# Patient Record
Sex: MALE | Race: BLACK OR AFRICAN AMERICAN | NOT HISPANIC OR LATINO | Employment: FULL TIME | URBAN - METROPOLITAN AREA
[De-identification: names, ages, dates, MRNs, and addresses within clinical notes are randomized per-mention and may not be internally consistent; named-entity substitution may affect disease eponyms.]

---

## 2017-03-20 ENCOUNTER — HOSPITAL ENCOUNTER (EMERGENCY)
Facility: HOSPITAL | Age: 52
Discharge: HOME | End: 2017-03-20
Attending: EMERGENCY MEDICINE | Admitting: EMERGENCY MEDICINE

## 2017-03-20 ENCOUNTER — APPOINTMENT (EMERGENCY)
Dept: RADIOLOGY | Facility: HOSPITAL | Age: 52
End: 2017-03-20

## 2017-03-20 VITALS
OXYGEN SATURATION: 99 % | DIASTOLIC BLOOD PRESSURE: 75 MMHG | TEMPERATURE: 97.7 F | RESPIRATION RATE: 20 BRPM | WEIGHT: 222 LBS | BODY MASS INDEX: 31.78 KG/M2 | HEIGHT: 70 IN | HEART RATE: 76 BPM | SYSTOLIC BLOOD PRESSURE: 138 MMHG

## 2017-03-20 DIAGNOSIS — R07.89 NON-CARDIAC CHEST PAIN: Primary | ICD-10-CM

## 2017-03-20 LAB
ANION GAP BLD CALC-SCNC: 18 MMOL/L (ref 4–13)
BUN BLD-MCNC: 19 MG/DL (ref 5–25)
CA-I BLD-SCNC: 1.2 MMOL/L (ref 1.12–1.32)
CHLORIDE BLD-SCNC: 101 MMOL/L (ref 100–108)
CREAT BLD-MCNC: 1.3 MG/DL (ref 0.6–1.3)
GFR SERPL CREATININE-BSD FRML MDRD: >60 ML/MIN/1.73SQ M
GLUCOSE SERPL-MCNC: 91 MG/DL (ref 65–140)
HCT VFR BLD CALC: 50 % (ref 36.5–49.3)
HGB BLDA-MCNC: 17 G/DL (ref 12–17)
PCO2 BLD: 26 MMOL/L (ref 21–32)
POTASSIUM BLD-SCNC: 4.3 MMOL/L (ref 3.5–5.3)
SODIUM BLD-SCNC: 139 MMOL/L (ref 136–145)
SPECIMEN SOURCE: ABNORMAL
SPECIMEN SOURCE: NORMAL
TROPONIN I BLD-MCNC: 0 NG/ML (ref 0–0.08)

## 2017-03-20 PROCEDURE — 99284 EMERGENCY DEPT VISIT MOD MDM: CPT

## 2017-03-20 PROCEDURE — 84484 ASSAY OF TROPONIN QUANT: CPT

## 2017-03-20 PROCEDURE — 85014 HEMATOCRIT: CPT

## 2017-03-20 PROCEDURE — 71020 HB CHEST X-RAY 2VW FRONTAL&LATL: CPT

## 2017-03-20 PROCEDURE — 93005 ELECTROCARDIOGRAM TRACING: CPT | Performed by: EMERGENCY MEDICINE

## 2017-03-20 PROCEDURE — 96372 THER/PROPH/DIAG INJ SC/IM: CPT

## 2017-03-20 PROCEDURE — 80047 BASIC METABLC PNL IONIZED CA: CPT

## 2017-03-20 RX ORDER — KETOROLAC TROMETHAMINE 30 MG/ML
30 INJECTION, SOLUTION INTRAMUSCULAR; INTRAVENOUS ONCE
Status: COMPLETED | OUTPATIENT
Start: 2017-03-20 | End: 2017-03-20

## 2017-03-20 RX ORDER — NAPROXEN 500 MG/1
500 TABLET ORAL 2 TIMES DAILY WITH MEALS
Qty: 20 TABLET | Refills: 0 | Status: SHIPPED | OUTPATIENT
Start: 2017-03-20 | End: 2017-07-24

## 2017-03-20 RX ADMIN — KETOROLAC TROMETHAMINE 30 MG: 30 INJECTION, SOLUTION INTRAMUSCULAR at 17:55

## 2017-03-21 LAB
ATRIAL RATE: 84 BPM
P AXIS: 63 DEGREES
PR INTERVAL: 144 MS
QRS AXIS: 26 DEGREES
QRSD INTERVAL: 76 MS
QT INTERVAL: 346 MS
QTC INTERVAL: 408 MS
T WAVE AXIS: 29 DEGREES
VENTRICULAR RATE: 84 BPM

## 2017-03-31 ENCOUNTER — HOSPITAL ENCOUNTER (EMERGENCY)
Facility: HOSPITAL | Age: 52
Discharge: HOME | End: 2017-03-31
Admitting: EMERGENCY MEDICINE

## 2017-03-31 ENCOUNTER — APPOINTMENT (EMERGENCY)
Dept: CT IMAGING | Facility: HOSPITAL | Age: 52
End: 2017-03-31

## 2017-03-31 VITALS
SYSTOLIC BLOOD PRESSURE: 140 MMHG | TEMPERATURE: 98 F | RESPIRATION RATE: 16 BRPM | DIASTOLIC BLOOD PRESSURE: 78 MMHG | OXYGEN SATURATION: 99 % | HEART RATE: 83 BPM

## 2017-03-31 DIAGNOSIS — R07.89 CHEST WALL PAIN: ICD-10-CM

## 2017-03-31 DIAGNOSIS — R91.1 PULMONARY NODULE: ICD-10-CM

## 2017-03-31 DIAGNOSIS — M41.80 DEXTROSCOLIOSIS: Primary | ICD-10-CM

## 2017-03-31 LAB
ANION GAP SERPL CALCULATED.3IONS-SCNC: 8 MMOL/L (ref 4–13)
ATRIAL RATE: 72 BPM
BASOPHILS # BLD AUTO: 0.04 THOUSANDS/ΜL (ref 0–0.1)
BASOPHILS NFR BLD AUTO: 1 % (ref 0–1)
BUN SERPL-MCNC: 16 MG/DL (ref 5–25)
CALCIUM SERPL-MCNC: 10.1 MG/DL (ref 8.3–10.1)
CHLORIDE SERPL-SCNC: 102 MMOL/L (ref 100–108)
CO2 SERPL-SCNC: 30 MMOL/L (ref 21–32)
CREAT SERPL-MCNC: 1.18 MG/DL (ref 0.6–1.3)
EOSINOPHIL # BLD AUTO: 0.12 THOUSAND/ΜL (ref 0–0.61)
EOSINOPHIL NFR BLD AUTO: 1 % (ref 0–6)
ERYTHROCYTE [DISTWIDTH] IN BLOOD BY AUTOMATED COUNT: 13.3 % (ref 11.6–15.1)
GFR SERPL CREATININE-BSD FRML MDRD: >60 ML/MIN/1.73SQ M
GLUCOSE SERPL-MCNC: 88 MG/DL (ref 65–140)
HCT VFR BLD AUTO: 49.3 % (ref 36.5–49.3)
HGB BLD-MCNC: 16.7 G/DL (ref 12–17)
LYMPHOCYTES # BLD AUTO: 3.15 THOUSANDS/ΜL (ref 0.6–4.47)
LYMPHOCYTES NFR BLD AUTO: 37 % (ref 14–44)
MCH RBC QN AUTO: 28 PG (ref 26.8–34.3)
MCHC RBC AUTO-ENTMCNC: 33.9 G/DL (ref 31.4–37.4)
MCV RBC AUTO: 83 FL (ref 82–98)
MONOCYTES # BLD AUTO: 0.54 THOUSAND/ΜL (ref 0.17–1.22)
MONOCYTES NFR BLD AUTO: 6 % (ref 4–12)
NEUTROPHILS # BLD AUTO: 4.73 THOUSANDS/ΜL (ref 1.85–7.62)
NEUTS SEG NFR BLD AUTO: 55 % (ref 43–75)
NRBC BLD AUTO-RTO: 0 /100 WBCS
P AXIS: 68 DEGREES
PLATELET # BLD AUTO: 367 THOUSANDS/UL (ref 149–390)
PMV BLD AUTO: 10.4 FL (ref 8.9–12.7)
POTASSIUM SERPL-SCNC: 4 MMOL/L (ref 3.5–5.3)
PR INTERVAL: 150 MS
QRS AXIS: 42 DEGREES
QRSD INTERVAL: 84 MS
QT INTERVAL: 364 MS
QTC INTERVAL: 398 MS
RBC # BLD AUTO: 5.97 MILLION/UL (ref 3.88–5.62)
SODIUM SERPL-SCNC: 140 MMOL/L (ref 136–145)
T WAVE AXIS: 35 DEGREES
TROPONIN I SERPL-MCNC: <0.02 NG/ML
VENTRICULAR RATE: 72 BPM
WBC # BLD AUTO: 8.61 THOUSAND/UL (ref 4.31–10.16)

## 2017-03-31 PROCEDURE — 93005 ELECTROCARDIOGRAM TRACING: CPT | Performed by: PHYSICIAN ASSISTANT

## 2017-03-31 PROCEDURE — 36415 COLL VENOUS BLD VENIPUNCTURE: CPT | Performed by: PHYSICIAN ASSISTANT

## 2017-03-31 PROCEDURE — 80048 BASIC METABOLIC PNL TOTAL CA: CPT | Performed by: PHYSICIAN ASSISTANT

## 2017-03-31 PROCEDURE — 71275 CT ANGIOGRAPHY CHEST: CPT

## 2017-03-31 PROCEDURE — 85025 COMPLETE CBC W/AUTO DIFF WBC: CPT | Performed by: PHYSICIAN ASSISTANT

## 2017-03-31 PROCEDURE — 84484 ASSAY OF TROPONIN QUANT: CPT | Performed by: PHYSICIAN ASSISTANT

## 2017-03-31 PROCEDURE — 99284 EMERGENCY DEPT VISIT MOD MDM: CPT

## 2017-03-31 RX ORDER — OXYCODONE HYDROCHLORIDE 5 MG/1
5 TABLET ORAL EVERY 4 HOURS PRN
Qty: 20 TABLET | Refills: 0 | Status: SHIPPED | OUTPATIENT
Start: 2017-03-31 | End: 2017-04-10

## 2017-03-31 RX ADMIN — IOHEXOL 85 ML: 350 INJECTION, SOLUTION INTRAVENOUS at 15:10

## 2017-03-31 RX ADMIN — SODIUM CHLORIDE 250 ML: 0.9 INJECTION, SOLUTION INTRAVENOUS at 14:41

## 2017-07-24 ENCOUNTER — HOSPITAL ENCOUNTER (EMERGENCY)
Facility: HOSPITAL | Age: 52
Discharge: HOME | End: 2017-07-24
Payer: COMMERCIAL

## 2017-07-24 VITALS
OXYGEN SATURATION: 99 % | DIASTOLIC BLOOD PRESSURE: 85 MMHG | SYSTOLIC BLOOD PRESSURE: 138 MMHG | WEIGHT: 214 LBS | HEART RATE: 63 BPM | TEMPERATURE: 98 F | BODY MASS INDEX: 30.64 KG/M2 | HEIGHT: 70 IN | RESPIRATION RATE: 18 BRPM

## 2017-07-24 DIAGNOSIS — M54.17 LUMBOSACRAL RADICULOPATHY: ICD-10-CM

## 2017-07-24 DIAGNOSIS — M54.50 LOW BACK PAIN: Primary | ICD-10-CM

## 2017-07-24 PROCEDURE — 99283 EMERGENCY DEPT VISIT LOW MDM: CPT

## 2017-07-24 RX ORDER — METHOCARBAMOL 500 MG/1
500 TABLET, FILM COATED ORAL 2 TIMES DAILY
Qty: 28 TABLET | Refills: 0 | Status: SHIPPED | OUTPATIENT
Start: 2017-07-24 | End: 2017-10-04

## 2017-07-24 RX ORDER — METHYLPREDNISOLONE 4 MG/1
TABLET ORAL
Qty: 21 TABLET | Refills: 0 | Status: SHIPPED | OUTPATIENT
Start: 2017-07-24 | End: 2017-10-04

## 2017-10-04 ENCOUNTER — HOSPITAL ENCOUNTER (EMERGENCY)
Facility: HOSPITAL | Age: 52
Discharge: HOME | End: 2017-10-04
Attending: EMERGENCY MEDICINE
Payer: COMMERCIAL

## 2017-10-04 ENCOUNTER — APPOINTMENT (EMERGENCY)
Dept: RADIOLOGY | Facility: HOSPITAL | Age: 52
End: 2017-10-04
Payer: COMMERCIAL

## 2017-10-04 VITALS
SYSTOLIC BLOOD PRESSURE: 157 MMHG | WEIGHT: 215 LBS | BODY MASS INDEX: 30.78 KG/M2 | HEIGHT: 70 IN | DIASTOLIC BLOOD PRESSURE: 80 MMHG | HEART RATE: 88 BPM | OXYGEN SATURATION: 100 % | TEMPERATURE: 98 F | RESPIRATION RATE: 18 BRPM

## 2017-10-04 DIAGNOSIS — M25.512 LEFT ANTERIOR SHOULDER PAIN: Primary | ICD-10-CM

## 2017-10-04 PROCEDURE — 99283 EMERGENCY DEPT VISIT LOW MDM: CPT

## 2017-10-04 PROCEDURE — 73030 X-RAY EXAM OF SHOULDER: CPT

## 2017-10-04 RX ORDER — OXYCODONE HYDROCHLORIDE AND ACETAMINOPHEN 5; 325 MG/1; MG/1
1 TABLET ORAL EVERY 6 HOURS PRN
Qty: 16 TABLET | Refills: 0 | Status: SHIPPED | OUTPATIENT
Start: 2017-10-04 | End: 2017-10-20

## 2017-10-04 RX ORDER — IBUPROFEN 800 MG/1
800 TABLET ORAL 2 TIMES DAILY
Qty: 20 TABLET | Refills: 0 | Status: SHIPPED | OUTPATIENT
Start: 2017-10-04 | End: 2018-04-30

## 2017-10-04 RX ORDER — OXYCODONE HYDROCHLORIDE AND ACETAMINOPHEN 5; 325 MG/1; MG/1
2 TABLET ORAL ONCE
Status: COMPLETED | OUTPATIENT
Start: 2017-10-04 | End: 2017-10-04

## 2017-10-04 RX ADMIN — OXYCODONE HYDROCHLORIDE AND ACETAMINOPHEN 2 TABLET: 5; 325 TABLET ORAL at 21:11

## 2017-10-05 NOTE — DISCHARGE INSTRUCTIONS
1   No heavy lifting until cleared by the orthopedic surgeon         Shoulder Pain, Ambulatory Care   GENERAL INFORMATION:   Shoulder pain  is a common problem and can affect your daily activities  Pain can be caused by a problem within your shoulder  Shoulder pain may also be caused by pain that spreads to your shoulder from another part of your body  Seek immediate care for the following symptoms:   · Severe pain    · Inability to move your arm or shoulder    · Numbness or tingling in your shoulder or arm  Treatment for shoulder pain  may include any of the following:  · Acetaminophen  decreases pain and fever  It is available without a doctor's order  Ask how much to take and how often to take it  Follow directions  Acetaminophen can cause liver damage if not taken correctly  · NSAIDs  help decrease swelling and pain or fever  This medicine is available with or without a doctor's order  NSAIDs can cause stomach bleeding or kidney problems in certain people  If you take blood thinner medicine, always ask your healthcare provider if NSAIDs are safe for you  Always read the medicine label and follow directions  · A steroid injection  may help decrease pain and swelling  · Surgery  may be needed for long-term pain and loss of function  Manage your symptoms:   · Apply ice  on your shoulder for 20 to 30 minutes every 2 hours or as directed  Use an ice pack, or put crushed ice in a plastic bag  Cover it with a towel  Ice helps prevent tissue damage and decreases swelling and pain  · Apply heat if ice does not help your symptoms  Apply heat on your shoulder for 20 to 30 minutes every 2 hours for as many days as directed  Heat helps decrease pain and muscle spasms  · Go to physical or occupational therapy as directed  A physical therapist teaches you exercises to help improve movement and strength, and to decrease pain   An occupational therapist teaches you skills to help with your daily activities  Prevent shoulder pain:   · Stretch and strengthen your shoulder  Use proper technique during exercises and sports  · Limit activities as directed  Try to avoid repeated overhead movements  Follow up with your healthcare provider or orthopedist as directed:  Write down your questions so you remember to ask them during your visits  CARE AGREEMENT:   You have the right to help plan your care  Learn about your health condition and how it may be treated  Discuss treatment options with your caregivers to decide what care you want to receive  You always have the right to refuse treatment  The above information is an  only  It is not intended as medical advice for individual conditions or treatments  Talk to your doctor, nurse or pharmacist before following any medical regimen to see if it is safe and effective for you  © 2014 [address] is for End User's use only and may not be sold, redistributed or otherwise used for commercial purposes  All illustrations and images included in CareNotes® are the copyrighted property of A D A M , Inc  or [address]  Rotator Cuff Injury   WHAT YOU NEED TO KNOW:   A rotator cuff injury is damage to the muscles or tendons of your rotator cuff  The rotator cuff is made up of a group of muscles and tendons that hold the shoulder joint in place  The damage may include stretching of your muscle or tears in the tendons  It may also include inflammation of the bursa (small sack of fluid around the joint)  DISCHARGE INSTRUCTIONS:   · Acetaminophen: This medicine decreases pain  Acetaminophen is available without a doctor's order  Ask how much to take and how often to take it  Follow directions  Acetaminophen can cause liver damage if not taken correctly  · NSAIDs:  These medicines decrease swelling, pain, and fever  NSAIDs are available without a doctor's order   Ask your healthcare provider which medicine is right for you  Ask how much to take and when to take it  Take as directed  NSAIDs can cause stomach bleeding or kidney problems if not taken correctly  · Pain medicine: You may be given a prescription medicine to decrease pain  Do not wait until the pain is severe before you take this medicine  · Steroids: This medicine may be injected into the rotator cuff area to decrease inflammation and pain  · Take your medicine as directed  Contact your healthcare provider if you think your medicine is not helping or if you have side effects  Tell him of her if you are allergic to any medicine  Keep a list of the medicines, vitamins, and herbs you take  Include the amounts, and when and why you take them  Bring the list or the pill bottles to follow-up visits  Carry your medicine list with you in case of an emergency  Follow up with your healthcare provider or orthopedist as directed:  Write down your questions so you remember to ask them during your visits  Physical therapy:  A physical therapist can teach you exercises to help improve movement and strength, and to decrease pain  These exercises may help you go back to your usual activities or return to playing sports  Self-care:   · Rest:  Rest may help your shoulder heal  Overuse of your shoulder can make your injury worse  Avoid heavy lifting, using your arms over your head, or any other activity that makes the pain worse  · Put ice or heat on your shoulder:  Use ice on your shoulder every few hours for the first several days  This may help decrease pain and swelling  After the first several days, a heating pad may help relax the muscles in your shoulder  Contact your healthcare provider or orthopedist if:   · The pain in your shoulder or arm is not improving, or is worse than before you started treatment  · You have new pain in your neck  · You have a fever  · You have questions or concerns about your condition or care    Return to the emergency department if:  · You suddenly cannot move your arm  · You have severe stomach or back pain, are vomiting blood, or have black bowel movements  © 2017 [address] Information is for End User's use only and may not be sold, redistributed or otherwise used for commercial purposes  All illustrations and images included in CareNotes® are the copyrighted property of A D A M , Inc  or Ricardo Quarles  The above information is an  only  It is not intended as medical advice for individual conditions or treatments  Talk to your doctor, nurse or pharmacist before following any medical regimen to see if it is safe and effective for you

## 2017-10-05 NOTE — ED PROVIDER NOTES
History  Chief Complaint   Patient presents with    Shoulder Pain     left shoulder pain for 3-4 days - pt states he does not have full ROM in it     HPI  51-year-old [place] American male with a chief complaint of left shoulder pain for the past 4 days  Patient states he is not able to lift his arm ir put it behind his back  Patient states he had a history of a rotator cuff tear approximately 4 years ago and has not had a problem with it since  Patient does admit to lifting weights but denies any specific injury  Patient denies any numbness or tingling down his left lower extremity  None       History reviewed  No pertinent past medical history  History reviewed  No pertinent surgical history  History reviewed  No pertinent family history  I have reviewed and agree with the history as documented  Social History   Substance Use Topics    Smoking status: Current Every Day Smoker     Packs/day: 0 50     Types: Cigarettes    Smokeless tobacco: Never Used    Alcohol use No        Review of Systems   Constitutional: Negative for chills, diaphoresis, fatigue and fever  HENT: Negative for congestion, ear pain, nosebleeds, rhinorrhea and sore throat  Eyes: Negative for photophobia, pain, discharge and visual disturbance  Respiratory: Negative for cough, choking, chest tightness, shortness of breath and wheezing  Cardiovascular: Negative for chest pain and palpitations  Gastrointestinal: Negative for abdominal distention, abdominal pain, diarrhea and vomiting  Endocrine: Negative for polydipsia and polyuria  Genitourinary: Negative for dysuria, flank pain, frequency and hematuria  Musculoskeletal: Positive for arthralgias, joint swelling and myalgias  Negative for back pain, gait problem and neck stiffness  Skin: Negative for color change, rash and wound  Neurological: Negative for dizziness, syncope and headaches     Psychiatric/Behavioral: Negative for behavioral problems, confusion and suicidal ideas  The patient is not nervous/anxious  All other systems reviewed and are negative  Physical Exam  ED Triage Vitals [10/04/17 1937]   Temperature Pulse Respirations Blood Pressure SpO2   98 °F (36 7 °C) 88 18 157/80 (!) 10 %      Temp Source Heart Rate Source Patient Position - Orthostatic VS BP Location FiO2 (%)   Temporal Monitor Sitting Left arm --      Pain Score       Worst Possible Pain           Physical Exam   Constitutional: He is oriented to person, place, and time  He appears well-developed and well-nourished  43-year-old  male sitting on the stretcher in no acute distress  HENT:   Head: Normocephalic and atraumatic  Mouth/Throat: Oropharynx is clear and moist    Eyes: EOM are normal  Pupils are equal, round, and reactive to light  Neck: Normal range of motion  Neck supple  Cardiovascular: Normal rate  Pulmonary/Chest: Effort normal    Abdominal: There is no tenderness  Musculoskeletal:   Left arm  There is limited range of motion in extension to about 90° also in abduction  Patient is unable to put his arm behind his back; patient is unable to elevate his arm above his shoulder and stops long term up  Neurological: He is alert and oriented to person, place, and time  No cranial nerve deficit or sensory deficit  He exhibits normal muscle tone  Coordination normal    Skin: Skin is warm and dry  Psychiatric: He has a normal mood and affect  Nursing note and vitals reviewed  ED Medications  Medications   oxyCODONE-acetaminophen (PERCOCET) 5-325 mg per tablet 2 tablet (2 tablets Oral Given 10/4/17 2111)       Diagnostic Studies  Labs Reviewed - No data to display    XR shoulder 2+ views LEFT   ED Interpretation   DJD          Procedures  Procedures      Phone Contacts  ED Phone Contact    ED Course  ED Course                                [facility]  CritCare Time     Differential diagnosis includes:  1  Left shoulder pain  2    Rotator cuff tear  3  Degenerative joint disease  4  Bursitis of the left shoulder  5  Left shoulder strain    Disposition  Final diagnoses:   Left anterior shoulder pain - Hx of rotator cuff tear     ED Disposition     ED Disposition Condition Comment    Discharge  Jasmyne Ibarra discharge to home/self care  Condition at discharge: Good        Follow-up Information     Follow up With Specialties Details Why Yann Mina MD Orthopedic Surgery In 1 week  [address] [facility]  [phone]          Discharge Medication List as of 10/4/2017  9:11 PM      START taking these medications    Details   ibuprofen (MOTRIN) 800 mg tablet Take 1 tablet by mouth 2 (two) times a day for 10 days, Starting Wed 10/4/2017, Until Sat 10/14/2017, Print      oxyCODONE-acetaminophen (PERCOCET) 5-325 mg per tablet Take 1 tablet by mouth every 6 (six) hours as needed for severe pain for up to 16 days Max Daily Amount: 4 tablets, Starting Wed 10/4/2017, Until Fri 10/20/2017, Print           No discharge procedures on file      ED Provider  Electronically Signed by       Josee Smith, DO  10/04/17 Urszula Tena 37, DO  10/04/17 2226

## 2018-04-30 ENCOUNTER — HOSPITAL ENCOUNTER (EMERGENCY)
Facility: HOSPITAL | Age: 53
Discharge: HOME | End: 2018-04-30
Attending: EMERGENCY MEDICINE | Admitting: EMERGENCY MEDICINE
Payer: COMMERCIAL

## 2018-04-30 VITALS
OXYGEN SATURATION: 100 % | RESPIRATION RATE: 18 BRPM | HEART RATE: 74 BPM | BODY MASS INDEX: 28.63 KG/M2 | TEMPERATURE: 97.6 F | SYSTOLIC BLOOD PRESSURE: 149 MMHG | DIASTOLIC BLOOD PRESSURE: 71 MMHG | HEIGHT: 70 IN | WEIGHT: 200 LBS

## 2018-04-30 DIAGNOSIS — M25.512 LEFT SHOULDER PAIN: Primary | ICD-10-CM

## 2018-04-30 PROCEDURE — 99283 EMERGENCY DEPT VISIT LOW MDM: CPT

## 2018-04-30 RX ORDER — NAPROXEN 500 MG/1
500 TABLET ORAL 2 TIMES DAILY WITH MEALS
Qty: 30 TABLET | Refills: 0 | Status: SHIPPED | OUTPATIENT
Start: 2018-04-30 | End: 2020-01-31

## 2018-04-30 NOTE — DISCHARGE INSTRUCTIONS
Shoulder Pain, Ambulatory Care   GENERAL INFORMATION:   Shoulder pain  is a common problem and can affect your daily activities  Pain can be caused by a problem within your shoulder  Shoulder pain may also be caused by pain that spreads to your shoulder from another part of your body  Seek immediate care for the following symptoms:   · Severe pain    · Inability to move your arm or shoulder    · Numbness or tingling in your shoulder or arm  Treatment for shoulder pain  may include any of the following:  · Acetaminophen  decreases pain and fever  It is available without a doctor's order  Ask how much to take and how often to take it  Follow directions  Acetaminophen can cause liver damage if not taken correctly  · NSAIDs  help decrease swelling and pain or fever  This medicine is available with or without a doctor's order  NSAIDs can cause stomach bleeding or kidney problems in certain people  If you take blood thinner medicine, always ask your healthcare provider if NSAIDs are safe for you  Always read the medicine label and follow directions  · A steroid injection  may help decrease pain and swelling  · Surgery  may be needed for long-term pain and loss of function  Manage your symptoms:   · Apply ice  on your shoulder for 20 to 30 minutes every 2 hours or as directed  Use an ice pack, or put crushed ice in a plastic bag  Cover it with a towel  Ice helps prevent tissue damage and decreases swelling and pain  · Apply heat if ice does not help your symptoms  Apply heat on your shoulder for 20 to 30 minutes every 2 hours for as many days as directed  Heat helps decrease pain and muscle spasms  · Go to physical or occupational therapy as directed  A physical therapist teaches you exercises to help improve movement and strength, and to decrease pain  An occupational therapist teaches you skills to help with your daily activities  Prevent shoulder pain:   · Stretch and strengthen your shoulder  Use proper technique during exercises and sports  · Limit activities as directed  Try to avoid repeated overhead movements  Follow up with your healthcare provider or orthopedist as directed:  Write down your questions so you remember to ask them during your visits  CARE AGREEMENT:   You have the right to help plan your care  Learn about your health condition and how it may be treated  Discuss treatment options with your caregivers to decide what care you want to receive  You always have the right to refuse treatment  The above information is an  only  It is not intended as medical advice for individual conditions or treatments  Talk to your doctor, nurse or pharmacist before following any medical regimen to see if it is safe and effective for you  © 2014 [address] is for End User's use only and may not be sold, redistributed or otherwise used for commercial purposes  All illustrations and images included in CareNotes® are the copyrighted property of A D A M , Inc  or Ricardo Quarles

## 2018-04-30 NOTE — ED PROVIDER NOTES
Pt Name: Jesenia Lafleur  MRN: [id number]  [place] 1965  Age/Sex: 46 y o  male  Date of evaluation: 4/30/2018  PCP: Puja Wiley MD    CHIEF COMPLAINT    Chief Complaint   Patient presents with    Shoulder Pain     Pt c/o 2-3 days of left sided shoulder pain  Denies injury, unable to lift  Denies numbness/tingling  HPI    Lauryn Steen presents to the Emergency Department complaining of left shoulder pain  He has had this in the past   He was seen here and then followed up with [facility] orthopedics and was told that he had a strain  He was started on meloxicam which has not been helping him  A few days ago he believes that he picked up one of his kids and now it has been worse  HPI      Past Medical and Surgical History    History reviewed  No pertinent past medical history  History reviewed  No pertinent surgical history  History reviewed  No pertinent family history  Social History   Substance Use Topics    Smoking status: Current Every Day Smoker     Packs/day: 0 50     Types: Cigarettes    Smokeless tobacco: Never Used    Alcohol use No              Allergies    No Known Allergies    Home Medications    Prior to Admission medications    Medication Sig Start Date End Date Taking? Authorizing Provider   ibuprofen (MOTRIN) 800 mg tablet Take 1 tablet by mouth 2 (two) times a day for 10 days 10/4/17 10/14/17  Tamika Ni, DO           Review of Systems    Review of Systems   Constitutional: Negative for activity change, appetite change, chills, fatigue and fever  HENT: Negative for congestion, rhinorrhea, sinus pressure, sneezing, sore throat and trouble swallowing  Eyes: Negative for photophobia and visual disturbance  Respiratory: Negative for chest tightness, shortness of breath and wheezing  Cardiovascular: Negative for chest pain and leg swelling     Gastrointestinal: Negative for abdominal distention, abdominal pain, constipation, diarrhea, nausea and vomiting  Endocrine: Negative for polydipsia, polyphagia and polyuria  Genitourinary: Negative for decreased urine volume, difficulty urinating, dysuria, flank pain, frequency and urgency  Musculoskeletal: Negative for back pain, gait problem, joint swelling and neck pain  Skin: Negative for color change, pallor and rash  Allergic/Immunologic: Negative for immunocompromised state  Neurological: Negative for seizures, syncope, speech difficulty, weakness, light-headedness and headaches  Psychiatric/Behavioral: Negative for confusion  All other systems reviewed and are negative  Physical Exam      ED Triage Vitals [04/30/18 1615]   Temperature Pulse Respirations Blood Pressure SpO2   97 6 °F (36 4 °C) 74 18 149/71 100 %      Temp Source Heart Rate Source Patient Position - Orthostatic VS BP Location FiO2 (%)   Oral Monitor Sitting Right arm --      Pain Score       Worst Possible Pain               Physical Exam   Constitutional: He is oriented to person, place, and time  He appears well-developed and well-nourished  No distress  HENT:   Head: Normocephalic and atraumatic  Nose: Nose normal    Eyes: Conjunctivae and EOM are normal  Pupils are equal, round, and reactive to light  Neck: Normal range of motion  Neck supple  Cardiovascular: Normal rate, regular rhythm and normal heart sounds  Exam reveals no gallop and no friction rub  No murmur heard  Pulmonary/Chest: Effort normal and breath sounds normal  No stridor  No respiratory distress  He has no wheezes  He has no rales  Abdominal: Soft  Bowel sounds are normal  There is no tenderness  There is no rebound and no guarding  Musculoskeletal:        Left shoulder: He exhibits decreased range of motion and tenderness  He exhibits no bony tenderness, no swelling, no effusion, no crepitus, no deformity and normal pulse  Arms:  Neurological: He is alert and oriented to person, place, and time  No cranial nerve deficit  Skin: Skin is warm and dry  No rash noted  He is not diaphoretic  Psychiatric: He has a normal mood and affect  His behavior is normal    Nursing note and vitals reviewed  Assessment and Plan    Rubio Parra is a 46 y o  male who presents with left shoulder pain  Physical examination remarkable for diminished ROM and tenderness to palpation  Differential diagnosis (not completely inclusive) includes rotator cuff injury  Plan will be to  treat symptomatically and refer for outpatient follow up  Patient would like to seek the opinion of a different ortho group  MDM    Diagnostic Results      Labs:      Radiology:    No orders to display       All radiology studies independently viewed by me and interpreted by the radiologist     Procedure    Procedures    CritCare Time      ED Course of Care and Re-Assessments        Medications - No data to display        FINAL IMPRESSION    Final diagnoses:   Left shoulder pain         DISPOSITION/PLAN    Time reflects when diagnosis was documented in both MDM as applicable and the Disposition within this note     Time User Action Codes Description Comment    4/30/2018  4:33 PM Marco Antonio Maharaj Add [A33 512] Left shoulder pain       ED Disposition     None      Follow-up Information     Follow up With Specialties Details Why [address] [facility] Orthopedic Surgery Schedule an appointment as soon as possible for a visit  [address] [place] [address] Tremaine Rick 09 [phone]            PATIENT REFERRED TO:    [facility] [address]  Lamont Beal 89  [phone]  Schedule an appointment as soon as possible for a visit        DISCHARGE MEDICATIONS:    Patient's Medications   Discharge Prescriptions    No medications on file       No discharge procedures on file           Genesis Shepherd DO       [facility],   04/30/18 1395

## 2019-07-15 ENCOUNTER — APPOINTMENT (OUTPATIENT)
Dept: RADIOLOGY | Facility: MEDICAL CENTER | Age: 54
End: 2019-07-15

## 2019-07-15 ENCOUNTER — TRANSCRIBE ORDERS (OUTPATIENT)
Dept: URGENT CARE | Facility: MEDICAL CENTER | Age: 54
End: 2019-07-15

## 2019-07-15 ENCOUNTER — APPOINTMENT (OUTPATIENT)
Dept: PHYSICAL THERAPY | Facility: MEDICAL CENTER | Age: 54
End: 2019-07-15

## 2019-07-15 DIAGNOSIS — Z00.00 PHYSICAL EXAM: ICD-10-CM

## 2019-07-15 DIAGNOSIS — Z00.00 PHYSICAL EXAM: Primary | ICD-10-CM

## 2019-07-15 PROCEDURE — 71046 X-RAY EXAM CHEST 2 VIEWS: CPT

## 2020-01-30 LAB — EXTERNAL HIV SCREEN: NORMAL

## 2020-01-31 ENCOUNTER — OFFICE VISIT (OUTPATIENT)
Dept: FAMILY MEDICINE CLINIC | Facility: CLINIC | Age: 55
End: 2020-01-31
Payer: COMMERCIAL

## 2020-01-31 VITALS
OXYGEN SATURATION: 98 % | RESPIRATION RATE: 16 BRPM | TEMPERATURE: 97.4 F | HEIGHT: 70 IN | BODY MASS INDEX: 29.35 KG/M2 | DIASTOLIC BLOOD PRESSURE: 90 MMHG | WEIGHT: 205 LBS | SYSTOLIC BLOOD PRESSURE: 140 MMHG | HEART RATE: 68 BPM

## 2020-01-31 DIAGNOSIS — M51.9 LUMBAR DISC DISEASE: ICD-10-CM

## 2020-01-31 DIAGNOSIS — Z82.49 FAMILY HISTORY OF PREMATURE CAD: Primary | ICD-10-CM

## 2020-01-31 DIAGNOSIS — Z00.00 HEALTH MAINTENANCE EXAMINATION: ICD-10-CM

## 2020-01-31 DIAGNOSIS — Z72.0 TOBACCO USE: ICD-10-CM

## 2020-01-31 PROCEDURE — 99386 PREV VISIT NEW AGE 40-64: CPT | Performed by: FAMILY MEDICINE

## 2020-01-31 RX ORDER — OXYCODONE HYDROCHLORIDE AND ACETAMINOPHEN 5; 325 MG/1; MG/1
1 TABLET ORAL EVERY 4 HOURS PRN
Qty: 30 TABLET | Refills: 0 | Status: SHIPPED | OUTPATIENT
Start: 2020-01-31 | End: 2020-02-13 | Stop reason: SDUPTHER

## 2020-01-31 RX ORDER — NAPROXEN 500 MG/1
500 TABLET ORAL 2 TIMES DAILY WITH MEALS
Qty: 60 TABLET | Refills: 3 | Status: SHIPPED | OUTPATIENT
Start: 2020-01-31 | End: 2021-02-09 | Stop reason: ALTCHOICE

## 2020-01-31 RX ORDER — METHOCARBAMOL 750 MG/1
750 TABLET, FILM COATED ORAL EVERY 6 HOURS PRN
Qty: 30 TABLET | Refills: 3 | Status: SHIPPED | OUTPATIENT
Start: 2020-01-31 | End: 2020-04-17

## 2020-01-31 RX ORDER — TIZANIDINE 4 MG/1
TABLET ORAL
Qty: 1 TABLET | Refills: 0 | OUTPATIENT
Start: 2020-01-31

## 2020-01-31 NOTE — PROGRESS NOTES
Assessment/Plan:  Full blood profile including PSA and colonoscopy are recommended  Patient wished to hold off on these at this time  He will return as needed  Diagnoses and all orders for this visit:    Family history of premature CAD  -     aspirin 81 MG tablet; Take 1 tablet (81 mg total) by mouth daily    Health maintenance examination    Lumbar disc disease  -     naproxen (NAPROSYN) 500 mg tablet; Take 1 tablet (500 mg total) by mouth 2 (two) times a day with meals  -     methocarbamol (ROBAXIN) 750 mg tablet; Take 1 tablet (750 mg total) by mouth every 6 (six) hours as needed for muscle spasms  -     oxyCODONE-acetaminophen (PERCOCET) 5-325 mg per tablet; Take 1 tablet by mouth every 4 (four) hours as needed for moderate painMax Daily Amount: 6 tablets    Tobacco use        Tobacco use  Smoking cessation recommended    BMI Counseling: Body mass index is 29 84 kg/m²  The BMI is above normal  Nutrition recommendations include decreasing portion sizes and moderation in carbohydrate intake  Exercise recommendations include exercising 3-5 times per week  No pharmacotherapy was ordered  Subjective:      Patient ID: Que Chung is a 47 y o  male  This is a new patient to our practice  He comes in to establish himself with us  Has a 10 day history of flare-up of his lumbar disc disease  The following portions of the patient's history were reviewed and updated as appropriate:   He has no past medical history on file  ,  does not have any pertinent problems on file  ,   has a past surgical history that includes Hernia repair (09/28/2019)  ,  family history includes Coronary artery disease in his mother; Hypertension in his mother  ,   reports that he has been smoking cigarettes  He has been smoking about 0 50 packs per day  He has never used smokeless tobacco  He reports that he does not drink alcohol or use drugs  ,  has No Known Allergies     Current Outpatient Medications   Medication Sig Dispense Refill    aspirin 81 MG tablet Take 1 tablet (81 mg total) by mouth daily      methocarbamol (ROBAXIN) 750 mg tablet Take 1 tablet (750 mg total) by mouth every 6 (six) hours as needed for muscle spasms 30 tablet 3    naproxen (NAPROSYN) 500 mg tablet Take 1 tablet (500 mg total) by mouth 2 (two) times a day with meals 60 tablet 3    oxyCODONE-acetaminophen (PERCOCET) 5-325 mg per tablet Take 1 tablet by mouth every 4 (four) hours as needed for moderate painMax Daily Amount: 6 tablets 30 tablet 0     No current facility-administered medications for this visit  Review of Systems   Constitutional: Negative  HENT: Negative  Respiratory: Negative  Cardiovascular: Negative  Gastrointestinal: Negative  Endocrine: Negative  Musculoskeletal: Positive for back pain  Allergic/Immunologic: Negative  Neurological: Negative  Hematological: Negative  Psychiatric/Behavioral: Negative  Objective:  Vitals:    01/31/20 0956   BP: 140/90   Pulse: 68   Resp: 16   Temp: (!) 97 4 °F (36 3 °C)   SpO2: 98%   Weight: 93 kg (205 lb)   Height: 5' 9 5" (1 765 m)     Body mass index is 29 84 kg/m²  Physical Exam   Constitutional: He is oriented to person, place, and time  He appears well-developed and well-nourished  HENT:   Head: Normocephalic and atraumatic  Right Ear: Tympanic membrane and external ear normal    Left Ear: Tympanic membrane and external ear normal    Eyes: Pupils are equal, round, and reactive to light  EOM are normal    Neck: Neck supple  Cardiovascular: Normal rate, regular rhythm and normal heart sounds  Pulmonary/Chest: Effort normal and breath sounds normal    Abdominal: Soft  Bowel sounds are normal    Musculoskeletal:   Tender lumbar paraspinal muscles  Straight leg raising bilaterally to 30° worsens his pain  Neurological: He is alert and oriented to person, place, and time  Skin: Skin is warm and dry     Psychiatric: He has a normal mood and affect   Thought content normal

## 2020-02-13 DIAGNOSIS — M51.9 LUMBAR DISC DISEASE: ICD-10-CM

## 2020-02-13 RX ORDER — OXYCODONE HYDROCHLORIDE AND ACETAMINOPHEN 5; 325 MG/1; MG/1
1 TABLET ORAL EVERY 4 HOURS PRN
Qty: 10 TABLET | Refills: 0 | Status: SHIPPED | OUTPATIENT
Start: 2020-02-13 | End: 2020-02-17 | Stop reason: SDUPTHER

## 2020-02-13 NOTE — TELEPHONE ENCOUNTER
Site checked  01/31/2020  1  01/31/2020  OXYCODONE-ACETAMINOPHEN 5-325  30 0  5  BR JOSH  [id number]  [facility] ([id number])  0  45  0 MME  [place]    09/26/2019  1  09/26/2019  OXYCODONE-ACETAMINOPHEN 5-325  20 0  4  DE THUY  [id number]  [facility] ([id number])  0  37 5 MME  [place]    09/17/2019  1  09/17/2019  OXYCODONE-ACETAMINOPHEN 5-325  30 0  5

## 2020-02-17 ENCOUNTER — OFFICE VISIT (OUTPATIENT)
Dept: FAMILY MEDICINE CLINIC | Facility: CLINIC | Age: 55
End: 2020-02-17
Payer: COMMERCIAL

## 2020-02-17 VITALS
DIASTOLIC BLOOD PRESSURE: 90 MMHG | HEART RATE: 72 BPM | OXYGEN SATURATION: 98 % | WEIGHT: 205 LBS | SYSTOLIC BLOOD PRESSURE: 138 MMHG | HEIGHT: 70 IN | TEMPERATURE: 98.5 F | RESPIRATION RATE: 16 BRPM | BODY MASS INDEX: 29.35 KG/M2

## 2020-02-17 DIAGNOSIS — M51.9 THORACIC DISC DISEASE: ICD-10-CM

## 2020-02-17 DIAGNOSIS — M51.9 LUMBAR DISC DISEASE: Primary | ICD-10-CM

## 2020-02-17 DIAGNOSIS — G89.4 CHRONIC PAIN SYNDROME: ICD-10-CM

## 2020-02-17 PROCEDURE — 99212 OFFICE O/P EST SF 10 MIN: CPT | Performed by: FAMILY MEDICINE

## 2020-02-17 PROCEDURE — 3008F BODY MASS INDEX DOCD: CPT | Performed by: FAMILY MEDICINE

## 2020-02-17 RX ORDER — OXYCODONE HYDROCHLORIDE AND ACETAMINOPHEN 5; 325 MG/1; MG/1
TABLET ORAL
Qty: 90 TABLET | Refills: 0 | Status: SHIPPED | OUTPATIENT
Start: 2020-02-17 | End: 2020-03-16 | Stop reason: SDUPTHER

## 2020-02-17 NOTE — PROGRESS NOTES
Assessment/Plan:  Return visit in 1 month  Patient is aware of the possibility of side effects, addiction and overdose with narcotic pain medication  We are having him sign a controlled substance contract  Diagnoses and all orders for this visit:    Lumbar disc disease  -     oxyCODONE-acetaminophen (PERCOCET) 5-325 mg per tablet; 1 tid for pain  -     Ambulatory referral to Pain Management; Future    Thoracic disc disease  -     Ambulatory referral to Pain Management; Future    Chronic pain syndrome        No problem-specific Assessment & Plan notes found for this encounter  Subjective:      Patient ID: Chava Benavidez is a 47 y o  male  Patient comes in with persistent mid and lower back pain  There is some radiation down the back of his legs  He get good relief with Percocet 3 times a day  He has been in pain management in the past and had epidural injections which gave him no relief  He has a great fear of having back surgery  The following portions of the patient's history were reviewed and updated as appropriate:   He has no past medical history on file  ,  does not have any pertinent problems on file  ,   has a past surgical history that includes Hernia repair (09/28/2019)  ,  family history includes Coronary artery disease in his mother; Hypertension in his mother  ,   reports that he has been smoking cigarettes  He has been smoking about 0 50 packs per day  He has never used smokeless tobacco  He reports that he does not drink alcohol or use drugs  ,  has No Known Allergies     Current Outpatient Medications   Medication Sig Dispense Refill    aspirin 81 MG tablet Take 1 tablet (81 mg total) by mouth daily      methocarbamol (ROBAXIN) 750 mg tablet Take 1 tablet (750 mg total) by mouth every 6 (six) hours as needed for muscle spasms 30 tablet 3    naproxen (NAPROSYN) 500 mg tablet Take 1 tablet (500 mg total) by mouth 2 (two) times a day with meals 60 tablet 3    oxyCODONE-acetaminophen (PERCOCET) 5-325 mg per tablet 1 tid for pain 90 tablet 0     No current facility-administered medications for this visit  Review of Systems   Constitutional: Negative  Respiratory: Negative  Cardiovascular: Negative  Objective:  Vitals:    02/17/20 1259   BP: 138/90   Pulse: 72   Resp: 16   Temp: 98 5 °F (36 9 °C)   SpO2: 98%   Weight: 93 kg (205 lb)   Height: 5' 9 5" (1 765 m)     Body mass index is 29 84 kg/m²  Physical Exam   Musculoskeletal:   Tender thoracic and lumbar paraspinal muscles  Straight leg raising bilaterally is negative

## 2020-03-06 ENCOUNTER — TELEPHONE (OUTPATIENT)
Dept: FAMILY MEDICINE CLINIC | Facility: CLINIC | Age: 55
End: 2020-03-06

## 2020-03-06 NOTE — TELEPHONE ENCOUNTER
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

## 2020-03-12 ENCOUNTER — TELEPHONE (OUTPATIENT)
Dept: FAMILY MEDICINE CLINIC | Facility: CLINIC | Age: 55
End: 2020-03-12

## 2020-03-12 DIAGNOSIS — M51.9 LUMBAR DISC DISEASE: ICD-10-CM

## 2020-03-12 NOTE — TELEPHONE ENCOUNTER
Patient called for refill on his pain medication  [facility] filled on 2/17/20 for 30 day supply  Patient states he has been out of his medication for 2 days  Patient states he is due for a refill  5 min's later he called and spoke with Franchesca Landers and wanted to cancel refill on medication cause he didn't want to void his contract  02/17/2020  1  02/17/2020  OXYCODONE-ACETAMINOPHEN 5-325  90 0  30  BR JOSH  [id number]  [facility] ([id number])  0  22 5 MME  [place]    02/13/2020  1  02/13/2020  OXYCODONE-ACETAMINOPHEN 5-325  10 0  2  DEMETRIA JESSICA  [id number]  [facility] ([id number])  0  37 5 MME  [place]    01/31/2020  1  01/31/2020  OXYCODONE-ACETAMINOPHEN 5-325  30 0  5  BR JOSH  [id number]  [facility] ([id number])  0  45  0 MME  [place]    09/26/2019  1  09/26/2019  OXYCODONE-ACETAMINOPHEN 5-325  20 0  4  DE THUY  [id number]  [facility] ([id number])  0  37 5 MME  [place]    09/17/2019  1  09/17/2019  OXYCODONE-ACETAMINOPHEN 5-325  30 0  5  NI TEL  [id number]  [facility] ([id number])  0  45  0 MME  [place]          This is a FYI  Would you like a Drug test collection at visit on 3/17/2020?

## 2020-03-16 DIAGNOSIS — M51.9 LUMBAR DISC DISEASE: ICD-10-CM

## 2020-03-16 RX ORDER — OXYCODONE HYDROCHLORIDE AND ACETAMINOPHEN 5; 325 MG/1; MG/1
TABLET ORAL
Qty: 90 TABLET | Refills: 0 | Status: SHIPPED | OUTPATIENT
Start: 2020-03-16 | End: 2020-04-17 | Stop reason: SDUPTHER

## 2020-03-16 NOTE — TELEPHONE ENCOUNTER
Pt has appt tomorrow for checkup and meds, UDS  He wants to postpone due to the CV  Ok to postpone and still get refills?   [phone]

## 2020-03-16 NOTE — TELEPHONE ENCOUNTER
Pt r/s for 1 month, April 17th at 1581     Ok for UDS there?    02/17/2020  1  02/17/2020  OXYCODONE-ACETAMINOPHEN 5-325  90 0  30  BR JOSH  [id number]  [facility] ([id number])  0  22 5 MME  [place]    02/13/2020  1  02/13/2020  OXYCODONE-ACETAMINOPHEN 5-325  10 0  2  DEMETRIA JESSICA  [id number]  [facility] ([id number])  0  37 5 MME  [place]    01/31/2020  1  01/31/2020  OXYCODONE-ACETAMINOPHEN 5-325  30 0  5  BR J[facility]  [id number]

## 2020-03-17 ENCOUNTER — TELEPHONE (OUTPATIENT)
Dept: FAMILY MEDICINE CLINIC | Facility: CLINIC | Age: 55
End: 2020-03-17

## 2020-03-17 NOTE — TELEPHONE ENCOUNTER
Prior Auth received on Oxycodone  Pretty sure this patient pays out of pocket  Unable to reach pharmacy by phone  I faxed them a note asking if he pays out of pocket or not to see if P A  Was needed

## 2020-04-17 ENCOUNTER — TELEMEDICINE (OUTPATIENT)
Dept: FAMILY MEDICINE CLINIC | Facility: CLINIC | Age: 55
End: 2020-04-17
Payer: COMMERCIAL

## 2020-04-17 DIAGNOSIS — M51.9 LUMBAR DISC DISEASE: ICD-10-CM

## 2020-04-17 PROCEDURE — G2012 BRIEF CHECK IN BY MD/QHP: HCPCS | Performed by: FAMILY MEDICINE

## 2020-04-17 RX ORDER — OXYCODONE HYDROCHLORIDE AND ACETAMINOPHEN 5; 325 MG/1; MG/1
TABLET ORAL
Qty: 90 TABLET | Refills: 0 | Status: SHIPPED | OUTPATIENT
Start: 2020-04-17 | End: 2020-05-11 | Stop reason: SDUPTHER

## 2020-05-11 DIAGNOSIS — M51.9 LUMBAR DISC DISEASE: ICD-10-CM

## 2020-05-11 RX ORDER — OXYCODONE HYDROCHLORIDE AND ACETAMINOPHEN 5; 325 MG/1; MG/1
TABLET ORAL
Qty: 90 TABLET | Refills: 0 | Status: SHIPPED | OUTPATIENT
Start: 2020-05-11 | End: 2020-06-12 | Stop reason: SDUPTHER

## 2020-06-12 DIAGNOSIS — M51.9 LUMBAR DISC DISEASE: ICD-10-CM

## 2020-06-12 RX ORDER — OXYCODONE HYDROCHLORIDE AND ACETAMINOPHEN 5; 325 MG/1; MG/1
TABLET ORAL
Qty: 90 TABLET | Refills: 0 | Status: SHIPPED | OUTPATIENT
Start: 2020-06-12 | End: 2020-07-10 | Stop reason: SDUPTHER

## 2020-07-10 DIAGNOSIS — M51.9 LUMBAR DISC DISEASE: ICD-10-CM

## 2020-07-10 RX ORDER — OXYCODONE HYDROCHLORIDE AND ACETAMINOPHEN 5; 325 MG/1; MG/1
TABLET ORAL
Qty: 90 TABLET | Refills: 0 | Status: SHIPPED | OUTPATIENT
Start: 2020-07-10 | End: 2020-08-10 | Stop reason: SDUPTHER

## 2020-07-10 NOTE — TELEPHONE ENCOUNTER
Medication:  PDMP   06/12/2020  1  06/12/2020  OXYCODONE-ACETAMINOPHEN 5-325  90 0  30  BR JOSH  [id number]  [facility] ([id number])  0  22 5 MME  [place]    05/14/2020  1  05/11/2020  OXYCODONE-ACETAMINOPHEN 5-325  90 0  30  BR JOSH  [id number]  [facility] ([id number])  0  22 5 MME  [place]    04/17/2020  1  04/17/2020  OXYCODONE-ACETAMINOPHEN 5-325  90 0  30  BR JOSH  [id number]  [facility] ([id number])  0  22 5 MME  [place]    03/16/2020  1  03/16/2020  OXYCODONE-ACETAMINOPHEN 5-325  90 0  30  BR JOSH  [id number]  [facility]            Active agreement on file -Yes

## 2020-07-10 NOTE — TELEPHONE ENCOUNTER
Oxycodone-acetaminophen (PERCOCET) 5-325 mg per tablet     Pt called for refill of above    Next appt 8/14/20

## 2020-08-10 DIAGNOSIS — M51.9 LUMBAR DISC DISEASE: ICD-10-CM

## 2020-08-10 RX ORDER — OXYCODONE HYDROCHLORIDE AND ACETAMINOPHEN 5; 325 MG/1; MG/1
TABLET ORAL
Qty: 90 TABLET | Refills: 0 | Status: SHIPPED | OUTPATIENT
Start: 2020-08-10 | End: 2020-09-16 | Stop reason: SDUPTHER

## 2020-08-10 NOTE — TELEPHONE ENCOUNTER
Medication:  PDMP   07/10/2020  1  07/10/2020  OXYCODONE-ACETAMINOPHEN 5-325  90 0  30  BR JOSH  [id number]  [facility] ([id number])  0  22 5 MME  [place]    06/12/2020  1  06/12/2020  OXYCODONE-ACETAMINOPHEN 5-325  90 0  30  BR JOSH  [id number]  [facility] ([id number])  0  22 5 MME  [place]    05/14/2020  1  05/11/2020  OXYCODONE-ACETAMINOPHEN 5-325  90 0  30  BR JOSH  [id number]  [facility] ([id number])  0  22 5 MME  [place]       Active agreement on file -Yes  Symptoms

## 2020-09-08 DIAGNOSIS — M51.9 LUMBAR DISC DISEASE: ICD-10-CM

## 2020-09-09 NOTE — TELEPHONE ENCOUNTER
Medication:  PDMP   08/10/2020  1  08/10/2020  OXYCODONE-ACETAMINOPHEN 5-325  90 0  30  BR JOSH  [id number]  [facility] ([id number])  0  22 5 MME  [place]    07/10/2020  1  07/10/2020  OXYCODONE-ACETAMINOPHEN 5-325  90 0  30  BR JOSH  [id number]  [facility] ([id number])  0  22 5 MME  [place]    06/12/2020  1  06/12/2020  OXYCODONE-ACETAMINOPHEN 5-325  90 0  30  BR JOSH  [id number]  [facility] ([id number])  0  22 5 MME  [place]    05/14/2020  1  05/11/2020  OXYCODONE-ACETAMINOPHEN 5-325  90 0  30  BR JOSH  [id number]  [facility] ([id number])  0  22 5 MME  [place]    04/17/2020  1  04/17/2020  OXYCODONE-ACETAMINOPHEN 5-325  90 0  30  BR JOSH  [id number]  [facility] ([id number])  0  22 5         Active agreement on file -Yes

## 2020-09-10 RX ORDER — OXYCODONE HYDROCHLORIDE AND ACETAMINOPHEN 5; 325 MG/1; MG/1
TABLET ORAL
Qty: 90 TABLET | Refills: 0 | OUTPATIENT
Start: 2020-09-10

## 2020-09-16 DIAGNOSIS — M51.9 LUMBAR DISC DISEASE: ICD-10-CM

## 2020-09-16 RX ORDER — OXYCODONE HYDROCHLORIDE AND ACETAMINOPHEN 5; 325 MG/1; MG/1
TABLET ORAL
Qty: 30 TABLET | Refills: 0 | Status: SHIPPED | OUTPATIENT
Start: 2020-09-16 | End: 2020-10-08 | Stop reason: SDUPTHER

## 2020-09-16 NOTE — TELEPHONE ENCOUNTER
Pt needs rx refilled  We had to reschedule his appt for 9/17/2020 as Dr Gayle Mckeon will not be in the office  Pt has requested to see Dr Gayle Mckeon only and has made appt for 10/8/2020  Yes

## 2020-09-16 NOTE — TELEPHONE ENCOUNTER
Medication:  PDMP   08/10/2020  1  08/10/2020  OXYCODONE-ACETAMINOPHEN 5-325  90 0  30  BR JOSH  [id number]  [facility] ([id number])  0  22 5 MME  [place]    07/10/2020  1  07/10/2020  OXYCODONE-ACETAMINOPHEN 5-325  90 0  30  BR JOSH  [id number]  [facility] ([id number])  0  22 5 MME  [place]    06/12/2020  1  06/12/2020  OXYCODONE-ACETAMINOPHEN 5-325  90 0  30  BR JOSH  [id number]  [facility] ([id number])  0  22 5 MME  [place]        Active agreement on file -Yes      Dr Abelardo Davila is off  Pt has appt 10/08/2020   Pt is out of this med

## 2020-09-17 ENCOUNTER — TELEPHONE (OUTPATIENT)
Dept: FAMILY MEDICINE CLINIC | Facility: CLINIC | Age: 55
End: 2020-09-17

## 2020-09-17 NOTE — TELEPHONE ENCOUNTER
Called patient to let him know med requested has been filled , unable to leave message no voicemail set up will try later

## 2020-10-08 ENCOUNTER — TELEPHONE (OUTPATIENT)
Dept: FAMILY MEDICINE CLINIC | Facility: CLINIC | Age: 55
End: 2020-10-08

## 2020-10-08 ENCOUNTER — OFFICE VISIT (OUTPATIENT)
Dept: FAMILY MEDICINE CLINIC | Facility: CLINIC | Age: 55
End: 2020-10-08

## 2020-10-08 VITALS
HEART RATE: 69 BPM | SYSTOLIC BLOOD PRESSURE: 136 MMHG | OXYGEN SATURATION: 99 % | DIASTOLIC BLOOD PRESSURE: 82 MMHG | TEMPERATURE: 97 F | HEIGHT: 70 IN | BODY MASS INDEX: 29.63 KG/M2 | WEIGHT: 207 LBS

## 2020-10-08 DIAGNOSIS — M51.9 LUMBAR DISC DISEASE: Primary | ICD-10-CM

## 2020-10-08 DIAGNOSIS — G89.4 CHRONIC PAIN SYNDROME: ICD-10-CM

## 2020-10-08 DIAGNOSIS — Z72.0 TOBACCO USE: ICD-10-CM

## 2020-10-08 DIAGNOSIS — M51.9 THORACIC DISC DISEASE: ICD-10-CM

## 2020-10-08 PROCEDURE — 99212 OFFICE O/P EST SF 10 MIN: CPT | Performed by: FAMILY MEDICINE

## 2020-10-08 RX ORDER — OXYCODONE HYDROCHLORIDE AND ACETAMINOPHEN 5; 325 MG/1; MG/1
TABLET ORAL
Qty: 120 TABLET | Refills: 0 | Status: SHIPPED | OUTPATIENT
Start: 2020-10-08 | End: 2020-11-09 | Stop reason: SDUPTHER

## 2020-11-09 DIAGNOSIS — M51.9 LUMBAR DISC DISEASE: ICD-10-CM

## 2020-11-09 RX ORDER — OXYCODONE HYDROCHLORIDE AND ACETAMINOPHEN 5; 325 MG/1; MG/1
TABLET ORAL
Qty: 120 TABLET | Refills: 0 | Status: SHIPPED | OUTPATIENT
Start: 2020-11-09 | End: 2020-12-08 | Stop reason: SDUPTHER

## 2020-12-08 DIAGNOSIS — M51.9 LUMBAR DISC DISEASE: ICD-10-CM

## 2020-12-08 RX ORDER — OXYCODONE HYDROCHLORIDE AND ACETAMINOPHEN 5; 325 MG/1; MG/1
TABLET ORAL
Qty: 120 TABLET | Refills: 0 | Status: SHIPPED | OUTPATIENT
Start: 2020-12-08 | End: 2021-01-04 | Stop reason: SDUPTHER

## 2020-12-11 ENCOUNTER — TELEPHONE (OUTPATIENT)
Dept: FAMILY MEDICINE CLINIC | Facility: CLINIC | Age: 55
End: 2020-12-11

## 2020-12-11 DIAGNOSIS — Z72.0 TOBACCO USE: Primary | ICD-10-CM

## 2020-12-11 RX ORDER — VARENICLINE TARTRATE 25 MG
KIT ORAL
Qty: 53 TABLET | Refills: 0 | Status: SHIPPED | OUTPATIENT
Start: 2020-12-11 | End: 2021-01-04 | Stop reason: SDUPTHER

## 2021-01-04 DIAGNOSIS — Z72.0 TOBACCO USE: ICD-10-CM

## 2021-01-04 DIAGNOSIS — M51.9 LUMBAR DISC DISEASE: ICD-10-CM

## 2021-01-04 RX ORDER — OXYCODONE HYDROCHLORIDE AND ACETAMINOPHEN 5; 325 MG/1; MG/1
TABLET ORAL
Qty: 120 TABLET | Refills: 0 | Status: SHIPPED | OUTPATIENT
Start: 2021-01-04 | End: 2021-02-03 | Stop reason: SDUPTHER

## 2021-01-04 RX ORDER — VARENICLINE TARTRATE 25 MG
KIT ORAL
Qty: 53 TABLET | Refills: 0 | Status: SHIPPED | OUTPATIENT
Start: 2021-01-04 | End: 2021-02-09 | Stop reason: ALTCHOICE

## 2021-01-04 NOTE — TELEPHONE ENCOUNTER
Medication:  PDMP   12/08/2020  1  12/08/2020  OXYCODONE-ACETAMINOPHEN 5-325  120 0  30  BR JOSH  [id number]  [facility] ([id number])  0  30 0 MME  [place]    11/09/2020  1  11/09/2020  OXYCODONE-ACETAMINOPHEN 5-325  120 0  30  BR JOSH  [id number]  [facility] ([id number])  0  30 0 MME  [place]    10/08/2020  1  10/08/2020  OXYCODONE-ACETAMINOPHEN 5-325  120 0  30  BR JOSH  [id number]  [facility] ([id number])  0  30 0 MME  [place]        Active agreement on file -Yes

## 2021-02-03 DIAGNOSIS — M51.9 LUMBAR DISC DISEASE: ICD-10-CM

## 2021-02-03 RX ORDER — OXYCODONE HYDROCHLORIDE AND ACETAMINOPHEN 5; 325 MG/1; MG/1
TABLET ORAL
Qty: 120 TABLET | Refills: 0 | Status: SHIPPED | OUTPATIENT
Start: 2021-02-03 | End: 2021-03-01 | Stop reason: SDUPTHER

## 2021-02-03 NOTE — TELEPHONE ENCOUNTER
01/04/2021  1  01/04/2021  OXYCODONE-ACETAMINOPHEN 5-325  120 0  30  BR JOSH  [id number]  [facility] ([id number])  0  30 0 MME  [place]    12/08/2020  1  12/08/2020  OXYCODONE-ACETAMINOPHEN 5-325  120 0  30  BR JOSH  [id number]  [facility] ([id number])  0  30 0 MME  [place]    11/09/2020  1  11/09/2020  OXYCODONE-ACETAMINOPHEN 5-325  120 0  30  BR JOSH  [id number]  [facility] (4776)  0  30 0 MME  [place]    10/08/2020  1  10/08/2020  OXYCODONE-ACETAMINOPHEN 5-325  120 0  30  BR JOSH

## 2021-02-09 DIAGNOSIS — Z72.0 TOBACCO USE: ICD-10-CM

## 2021-02-09 DIAGNOSIS — Z72.0 TOBACCO USE: Primary | ICD-10-CM

## 2021-02-09 PROBLEM — Z87.19 HISTORY OF UMBILICAL HERNIA: Status: ACTIVE | Noted: 2019-09-30

## 2021-02-09 PROBLEM — M41.9 SCOLIOSIS DEFORMITY OF SPINE: Status: ACTIVE | Noted: 2021-02-09

## 2021-02-09 RX ORDER — VARENICLINE TARTRATE
KIT
Qty: 53 TABLET | Refills: 0 | OUTPATIENT
Start: 2021-02-09

## 2021-02-09 RX ORDER — VARENICLINE TARTRATE 1 MG/1
1 TABLET, FILM COATED ORAL 2 TIMES DAILY
Qty: 60 TABLET | Refills: 5 | Status: SHIPPED | OUTPATIENT
Start: 2021-02-09 | End: 2021-11-24

## 2021-02-09 NOTE — TELEPHONE ENCOUNTER
Medication:  PDMP   02/03/2021  1  02/03/2021  OXYCODONE-ACETAMINOPHEN 5-325  120 0  30  BR JOSH  [id number]  [facility] ([id number])  0  30 0 MME  [place]    01/04/2021  1  01/04/2021  OXYCODONE-ACETAMINOPHEN 5-325  120 0  30  BR JOSH  [id number]  [facility] ([id number])  0  30 0 MME  [place]    12/08/2020  1  12/08/2020  OXYCODONE-ACETAMINOPHEN 5-325  120 0  30  BR JOSH  [id number]  [facility] ([id number])  0  30 0 MME  [place]       Active agreement on file -Yes      Dr Al Dumont pt  Al Dumont is out of the office until Thursday

## 2021-03-01 DIAGNOSIS — M51.9 LUMBAR DISC DISEASE: ICD-10-CM

## 2021-03-01 RX ORDER — OXYCODONE HYDROCHLORIDE AND ACETAMINOPHEN 5; 325 MG/1; MG/1
TABLET ORAL
Qty: 120 TABLET | Refills: 0 | Status: SHIPPED | OUTPATIENT
Start: 2021-03-01 | End: 2021-03-29 | Stop reason: SDUPTHER

## 2021-03-01 NOTE — TELEPHONE ENCOUNTER
02/03/2021  1  02/03/2021  OXYCODONE-ACETAMINOPHEN 5-325  120 0  30  BR JOSH  [id number]  [facility] ([id number])  0  30 0 MME  [place]    01/04/2021  1  01/04/2021  OXYCODONE-ACETAMINOPHEN 5-325  120 0  30  BR JOSH  [id number]  [facility] ([id number])  0  30 0 MME  [place]    12/08/2020  1  12/08/2020  OXYCODONE-ACETAMINOPHEN 5-325  120 0  30  BR JOSH  [id number]  [facility] ([id number])  0  30 0 MME  [place]    11/09/2020  1  11/09/2020  OXYCODONE-ACETAMINOPHEN 5-325  120 0  30  BR JOSH  [id number]  [facility] ([id number])  0  30 0 MME  [place]    10/08/2020  1  10/08/2020  OXYCODONE-ACETAMINOPHEN 5-325  120 0  30  BR JOSH  [id number]  [facility] ([id number])

## 2021-03-29 ENCOUNTER — OFFICE VISIT (OUTPATIENT)
Dept: FAMILY MEDICINE CLINIC | Facility: CLINIC | Age: 56
End: 2021-03-29
Payer: COMMERCIAL

## 2021-03-29 ENCOUNTER — TELEPHONE (OUTPATIENT)
Dept: FAMILY MEDICINE CLINIC | Facility: CLINIC | Age: 56
End: 2021-03-29

## 2021-03-29 VITALS
SYSTOLIC BLOOD PRESSURE: 146 MMHG | HEIGHT: 70 IN | BODY MASS INDEX: 30.78 KG/M2 | OXYGEN SATURATION: 98 % | WEIGHT: 215 LBS | TEMPERATURE: 97.5 F | DIASTOLIC BLOOD PRESSURE: 96 MMHG | HEART RATE: 81 BPM

## 2021-03-29 DIAGNOSIS — Z72.0 TOBACCO USE: ICD-10-CM

## 2021-03-29 DIAGNOSIS — Z12.12 SCREENING FOR COLORECTAL CANCER: ICD-10-CM

## 2021-03-29 DIAGNOSIS — Z23 ENCOUNTER FOR IMMUNIZATION: ICD-10-CM

## 2021-03-29 DIAGNOSIS — R80.9 PROTEINURIA, UNSPECIFIED TYPE: Primary | ICD-10-CM

## 2021-03-29 DIAGNOSIS — Z11.59 NEED FOR HEPATITIS C SCREENING TEST: ICD-10-CM

## 2021-03-29 DIAGNOSIS — G89.4 CHRONIC PAIN SYNDROME: ICD-10-CM

## 2021-03-29 DIAGNOSIS — R03.0 ELEVATED BLOOD PRESSURE READING: ICD-10-CM

## 2021-03-29 DIAGNOSIS — Z00.00 HEALTH MAINTENANCE EXAMINATION: ICD-10-CM

## 2021-03-29 DIAGNOSIS — Z13.0 SCREENING FOR DEFICIENCY ANEMIA: ICD-10-CM

## 2021-03-29 DIAGNOSIS — Z12.5 PROSTATE CANCER SCREENING: ICD-10-CM

## 2021-03-29 DIAGNOSIS — R63.5 WEIGHT GAIN: ICD-10-CM

## 2021-03-29 DIAGNOSIS — E78.5 HYPERLIPIDEMIA, UNSPECIFIED HYPERLIPIDEMIA TYPE: ICD-10-CM

## 2021-03-29 DIAGNOSIS — M51.9 LUMBAR DISC DISEASE: ICD-10-CM

## 2021-03-29 DIAGNOSIS — Z13.1 SCREENING FOR DIABETES MELLITUS (DM): ICD-10-CM

## 2021-03-29 DIAGNOSIS — Z12.11 COLON CANCER SCREENING: ICD-10-CM

## 2021-03-29 DIAGNOSIS — Z12.11 SCREENING FOR COLORECTAL CANCER: ICD-10-CM

## 2021-03-29 PROBLEM — Z87.19 HISTORY OF UMBILICAL HERNIA: Status: RESOLVED | Noted: 2019-09-30 | Resolved: 2021-03-29

## 2021-03-29 PROCEDURE — 4004F PT TOBACCO SCREEN RCVD TLK: CPT | Performed by: FAMILY MEDICINE

## 2021-03-29 PROCEDURE — 99396 PREV VISIT EST AGE 40-64: CPT | Performed by: FAMILY MEDICINE

## 2021-03-29 PROCEDURE — 3008F BODY MASS INDEX DOCD: CPT | Performed by: FAMILY MEDICINE

## 2021-03-29 PROCEDURE — 3725F SCREEN DEPRESSION PERFORMED: CPT | Performed by: FAMILY MEDICINE

## 2021-03-29 RX ORDER — MULTIVITAMIN
1 TABLET ORAL DAILY
COMMUNITY

## 2021-03-29 RX ORDER — PROPRANOLOL/HYDROCHLOROTHIAZID 40 MG-25MG
TABLET ORAL
COMMUNITY

## 2021-03-29 RX ORDER — OXYCODONE HYDROCHLORIDE AND ACETAMINOPHEN 5; 325 MG/1; MG/1
TABLET ORAL
Qty: 120 TABLET | Refills: 0 | Status: SHIPPED | OUTPATIENT
Start: 2021-03-29 | End: 2021-04-03 | Stop reason: SDUPTHER

## 2021-03-29 RX ORDER — SACCHAROMYCES BOULARDII 250 MG
250 CAPSULE ORAL 2 TIMES DAILY
COMMUNITY

## 2021-03-29 NOTE — PROGRESS NOTES
BMI Counseling: Body mass index is 31 29 kg/m²  The BMI is above normal  Nutrition recommendations include decreasing portion sizes and moderation in carbohydrate intake  Exercise recommendations include exercising 3-5 times per week  No pharmacotherapy was ordered  Assessment/Plan:      Return visit in 3 months  We will call with results of his testing  Problem List Items Addressed This Visit        Musculoskeletal and Integument    Lumbar disc disease    Relevant Medications    oxyCODONE-acetaminophen (PERCOCET) 5-325 mg per tablet       Other    Health maintenance examination    Tobacco use       Continue Chantix         Chronic pain syndrome    Weight gain    Relevant Orders    TSH, 3rd generation with Free T4 reflex    Elevated blood pressure reading       He is encouraged to check blood pressure on his own  Other Visit Diagnoses     Proteinuria, unspecified type    -  Primary    Relevant Orders    UA (URINE) with reflex to Scope    Need for hepatitis C screening test        Relevant Orders    Hepatitis C antibody    Encounter for immunization        Screening for colorectal cancer        Screening for deficiency anemia        Relevant Orders    CBC and differential    Screening for diabetes mellitus (DM)        Relevant Orders    Comprehensive metabolic panel    Hyperlipidemia, unspecified hyperlipidemia type        Relevant Orders    Lipid panel    Prostate cancer screening        Relevant Orders    PSA, Total Screen    Colon cancer screening        Relevant Orders    Cologuard            Subjective:      Patient ID: Joseph Cooper is a 54 y o  male  Patient comes in for checkup  He is taking Chantix to help him quit smoking  He is concerned regarding weight gain  He continues to have low back pain for which he takes 3 Percocet per day        The following portions of the patient's history were reviewed and updated as appropriate:   Past Medical History:  He has no past medical history on file ,  _______________________________________________________________________  Medical Problems:  does not have any pertinent problems on file ,  _______________________________________________________________________  Past Surgical History:   has a past surgical history that includes Hernia repair (09/28/2019)  ,  _______________________________________________________________________  Family History:  family history includes Coronary artery disease in his mother; Hypertension in his mother ,  _______________________________________________________________________  Social History:   reports that he has been smoking cigarettes  He has been smoking about 0 25 packs per day  He has never used smokeless tobacco  He reports that he does not drink alcohol or use drugs  ,  _______________________________________________________________________  Allergies:  has No Known Allergies     _______________________________________________________________________  Current Outpatient Medications   Medication Sig Dispense Refill    b complex vitamins tablet Take 1 tablet by mouth daily      Black Currant Seed Oil 500 MG CAPS Take 1 capsule by mouth daily      Cholecalciferol (Vitamin D3) 125 MCG (5000 UT) TABS Take 5,000 Units by mouth daily      Diclofenac Sodium (VOLTAREN) 1 % Apply 1 application topically 4 (four) times a day      Multiple Vitamin (multivitamin) tablet Take 1 tablet by mouth daily      NON FORMULARY Corn silk tablet herbal supplement      oxyCODONE-acetaminophen (PERCOCET) 5-325 mg per tablet 1 qid for pain 120 tablet 0    saccharomyces boulardii (FLORASTOR) 250 mg capsule Take 250 mg by mouth 2 (two) times a day      Turmeric 500 MG CAPS Take by mouth      varenicline (CHANTIX) 1 mg tablet Take 1 tablet (1 mg total) by mouth 2 (two) times a day 60 tablet 5     No current facility-administered medications for this visit  _______________________________________________________________________  Review of Systems   Constitutional: Negative  Gastrointestinal: Negative  Musculoskeletal: Positive for back pain  Objective:  Vitals:    03/29/21 1613   BP: 146/96   BP Location: Left arm   Patient Position: Sitting   Cuff Size: Large   Pulse: 81   Temp: 97 5 °F (36 4 °C)   TempSrc: Tympanic   SpO2: 98%   Weight: 97 5 kg (215 lb)   Height: 5' 9 5" (1 765 m)     Body mass index is 31 29 kg/m²  Physical Exam  Constitutional:       Appearance: He is well-developed  HENT:      Head: Normocephalic and atraumatic  Right Ear: Tympanic membrane, ear canal and external ear normal       Left Ear: Tympanic membrane, ear canal and external ear normal    Eyes:      Pupils: Pupils are equal, round, and reactive to light  Neck:      Musculoskeletal: Neck supple  Cardiovascular:      Rate and Rhythm: Normal rate and regular rhythm  Heart sounds: Normal heart sounds  Pulmonary:      Effort: Pulmonary effort is normal       Breath sounds: Normal breath sounds  Abdominal:      General: Bowel sounds are normal       Palpations: Abdomen is soft  Musculoskeletal: Normal range of motion  Skin:     General: Skin is warm and dry  Neurological:      Mental Status: He is alert and oriented to person, place, and time  Psychiatric:         Thought Content:  Thought content normal

## 2021-03-30 ENCOUNTER — APPOINTMENT (OUTPATIENT)
Dept: LAB | Facility: HOSPITAL | Age: 56
End: 2021-03-30
Attending: FAMILY MEDICINE
Payer: COMMERCIAL

## 2021-03-30 DIAGNOSIS — Z13.0 SCREENING FOR DEFICIENCY ANEMIA: ICD-10-CM

## 2021-03-30 DIAGNOSIS — Z12.5 PROSTATE CANCER SCREENING: ICD-10-CM

## 2021-03-30 DIAGNOSIS — R63.5 WEIGHT GAIN: ICD-10-CM

## 2021-03-30 DIAGNOSIS — Z11.59 NEED FOR HEPATITIS C SCREENING TEST: ICD-10-CM

## 2021-03-30 DIAGNOSIS — E78.5 HYPERLIPIDEMIA, UNSPECIFIED HYPERLIPIDEMIA TYPE: ICD-10-CM

## 2021-03-30 DIAGNOSIS — Z13.1 SCREENING FOR DIABETES MELLITUS (DM): ICD-10-CM

## 2021-03-30 LAB
ALBUMIN SERPL BCP-MCNC: 4 G/DL (ref 3.5–5)
ALP SERPL-CCNC: 100 U/L (ref 46–116)
ALT SERPL W P-5'-P-CCNC: 30 U/L (ref 12–78)
ANION GAP SERPL CALCULATED.3IONS-SCNC: 6 MMOL/L (ref 4–13)
AST SERPL W P-5'-P-CCNC: 19 U/L (ref 5–45)
BACTERIA UR QL AUTO: NORMAL /HPF
BASOPHILS # BLD AUTO: 0.04 THOUSANDS/ΜL (ref 0–0.1)
BASOPHILS NFR BLD AUTO: 1 % (ref 0–1)
BILIRUB SERPL-MCNC: 1.06 MG/DL (ref 0.2–1)
BILIRUB UR QL STRIP: NEGATIVE
BUN SERPL-MCNC: 14 MG/DL (ref 5–25)
CALCIUM SERPL-MCNC: 9.3 MG/DL (ref 8.3–10.1)
CHLORIDE SERPL-SCNC: 102 MMOL/L (ref 100–108)
CHOLEST SERPL-MCNC: 181 MG/DL (ref 50–200)
CLARITY UR: CLEAR
CO2 SERPL-SCNC: 30 MMOL/L (ref 21–32)
COLOR UR: YELLOW
CREAT SERPL-MCNC: 1.26 MG/DL (ref 0.6–1.3)
EOSINOPHIL # BLD AUTO: 0.5 THOUSAND/ΜL (ref 0–0.61)
EOSINOPHIL NFR BLD AUTO: 7 % (ref 0–6)
ERYTHROCYTE [DISTWIDTH] IN BLOOD BY AUTOMATED COUNT: 13.2 % (ref 11.6–15.1)
GFR SERPL CREATININE-BSD FRML MDRD: 74 ML/MIN/1.73SQ M
GLUCOSE P FAST SERPL-MCNC: 91 MG/DL (ref 65–99)
GLUCOSE UR STRIP-MCNC: NEGATIVE MG/DL
HCT VFR BLD AUTO: 47.2 % (ref 36.5–49.3)
HCV AB SER QL: NORMAL
HDLC SERPL-MCNC: 46 MG/DL
HGB BLD-MCNC: 15.9 G/DL (ref 12–17)
HGB UR QL STRIP.AUTO: ABNORMAL
IMM GRANULOCYTES # BLD AUTO: 0.03 THOUSAND/UL (ref 0–0.2)
IMM GRANULOCYTES NFR BLD AUTO: 0 % (ref 0–2)
KETONES UR STRIP-MCNC: NEGATIVE MG/DL
LDLC SERPL CALC-MCNC: 121 MG/DL (ref 0–100)
LEUKOCYTE ESTERASE UR QL STRIP: NEGATIVE
LYMPHOCYTES # BLD AUTO: 2.44 THOUSANDS/ΜL (ref 0.6–4.47)
LYMPHOCYTES NFR BLD AUTO: 32 % (ref 14–44)
MCH RBC QN AUTO: 28.2 PG (ref 26.8–34.3)
MCHC RBC AUTO-ENTMCNC: 33.7 G/DL (ref 31.4–37.4)
MCV RBC AUTO: 84 FL (ref 82–98)
MONOCYTES # BLD AUTO: 0.53 THOUSAND/ΜL (ref 0.17–1.22)
MONOCYTES NFR BLD AUTO: 7 % (ref 4–12)
NEUTROPHILS # BLD AUTO: 4.03 THOUSANDS/ΜL (ref 1.85–7.62)
NEUTS SEG NFR BLD AUTO: 53 % (ref 43–75)
NITRITE UR QL STRIP: NEGATIVE
NON-SQ EPI CELLS URNS QL MICRO: NORMAL /HPF
NONHDLC SERPL-MCNC: 135 MG/DL
NRBC BLD AUTO-RTO: 0 /100 WBCS
PH UR STRIP.AUTO: 6 [PH]
PLATELET # BLD AUTO: 332 THOUSANDS/UL (ref 149–390)
PMV BLD AUTO: 10.1 FL (ref 8.9–12.7)
POTASSIUM SERPL-SCNC: 4.2 MMOL/L (ref 3.5–5.3)
PROT SERPL-MCNC: 8.1 G/DL (ref 6.4–8.2)
PROT UR STRIP-MCNC: ABNORMAL MG/DL
PSA SERPL-MCNC: 1.2 NG/ML (ref 0–4)
RBC # BLD AUTO: 5.63 MILLION/UL (ref 3.88–5.62)
RBC #/AREA URNS AUTO: NORMAL /HPF
SODIUM SERPL-SCNC: 138 MMOL/L (ref 136–145)
SP GR UR STRIP.AUTO: 1.02 (ref 1–1.03)
TRIGL SERPL-MCNC: 68 MG/DL
TSH SERPL DL<=0.05 MIU/L-ACNC: 1.25 UIU/ML (ref 0.36–3.74)
UROBILINOGEN UR QL STRIP.AUTO: 0.2 E.U./DL
WBC # BLD AUTO: 7.57 THOUSAND/UL (ref 4.31–10.16)
WBC #/AREA URNS AUTO: NORMAL /HPF

## 2021-03-30 PROCEDURE — 85025 COMPLETE CBC W/AUTO DIFF WBC: CPT

## 2021-03-30 PROCEDURE — G0103 PSA SCREENING: HCPCS

## 2021-03-30 PROCEDURE — 36415 COLL VENOUS BLD VENIPUNCTURE: CPT

## 2021-03-30 PROCEDURE — 84443 ASSAY THYROID STIM HORMONE: CPT

## 2021-03-30 PROCEDURE — 86803 HEPATITIS C AB TEST: CPT

## 2021-03-30 PROCEDURE — 80053 COMPREHEN METABOLIC PANEL: CPT

## 2021-03-30 PROCEDURE — 81001 URINALYSIS AUTO W/SCOPE: CPT | Performed by: FAMILY MEDICINE

## 2021-03-30 PROCEDURE — 80061 LIPID PANEL: CPT

## 2021-04-03 DIAGNOSIS — M51.9 LUMBAR DISC DISEASE: ICD-10-CM

## 2021-04-05 DIAGNOSIS — M51.9 LUMBAR DISC DISEASE: ICD-10-CM

## 2021-04-05 RX ORDER — OXYCODONE HYDROCHLORIDE AND ACETAMINOPHEN 5; 325 MG/1; MG/1
TABLET ORAL
Qty: 120 TABLET | Refills: 0 | Status: SHIPPED | OUTPATIENT
Start: 2021-04-05 | End: 2021-05-03 | Stop reason: SDUPTHER

## 2021-04-05 RX ORDER — OXYCODONE HYDROCHLORIDE AND ACETAMINOPHEN 5; 325 MG/1; MG/1
TABLET ORAL
Qty: 120 TABLET | Refills: 0 | OUTPATIENT
Start: 2021-04-05

## 2021-04-05 NOTE — TELEPHONE ENCOUNTER
03/03/2021  1  03/01/2021  OXYCODONE-ACETAMINOPHEN 5-325  120 0  30  BR JOSH  [id number]  [facility] ([id number])  0  30 0 MME  [place]    02/03/2021  1  02/03/2021  OXYCODONE-ACETAMINOPHEN 5-325  120 0  30  BR JOSH  [id number]  [facility] ([id number])  0  30 0 MME  [place]    01/04/2021  1  01/04/2021  OXYCODONE-ACETAMINOPHEN 5-325  120 0  30  BR JOSH  [id number]  [facility] ([id number])  0  30 0 MME  [place]    12/08/2020  1  12/08/2020  OXYCODONE-ACETAMINOPHEN 5-325  120 0  30  BR JOSH  [id number]  [facility] ([id number])  0  30 0 MME  [place]    11/09/2020  1  11/09/2020  OXYCODONE-ACETAMINOPHEN 5-325  120 0  30  BR JOSH  [id number]  [facility] ([id number])  0  30 0 MME  [place]    10/08/2020  1  10/08/2020  OXYCODONE-ACETAMINOPHEN 5-325  120 0  30  BR JOSH  [id number]  [facility] ([id number])  0  30 0 MME  [place]    09/16/2020  1  09/16/2020  OXYCODONE-ACETAMINOPHEN 5-325  30 0  10  DEMETRIA JESSICA  [id number]  [facility] ([id number])  0  22 5 MME  [place]    08/10/2020  1  08/10/2020  OXYCODONE-ACETAMINOPHEN 5-325  90 0  30  BR JOSH  [id number]  [facility] ([id number])  0  22 5 MME  [place]

## 2021-05-03 DIAGNOSIS — M51.9 LUMBAR DISC DISEASE: ICD-10-CM

## 2021-05-03 RX ORDER — OXYCODONE HYDROCHLORIDE AND ACETAMINOPHEN 5; 325 MG/1; MG/1
TABLET ORAL
Qty: 120 TABLET | Refills: 0 | Status: SHIPPED | OUTPATIENT
Start: 2021-05-03 | End: 2021-07-06 | Stop reason: SDUPTHER

## 2021-05-03 NOTE — TELEPHONE ENCOUNTER
Medication:  PDMP   04/05/2021  1  04/05/2021  OXYCODONE-ACETAMINOPHEN 5-325  120 0  30  BR JOSH  [id number]  [facility] ([id number])  0  30 0 MME  [place]    03/03/2021  1  03/01/2021  OXYCODONE-ACETAMINOPHEN 5-325  120 0  30  BR JOSH  [id number]  [facility] ([id number])  0  30 0 MME  [place]    02/03/2021  1  02/03/2021  OXYCODONE-ACETAMINOPHEN 5-325  120 0  30  BR JOSH  [id number]  [facility] ([id number])  0  30 0 MME  [place]    01/04/2021  1  01/04/2021  OXYCODONE-ACETAMINOPHEN 5-325  120 0  30  BR JOSH  [id number]  [facility] ([id number])  0  30 0 MME  Private [place]    12/08/2020  1  12/08/2020  OXYCODONE-ACETAMINOPHEN 5-325  120 0  30  BR JOSH  [id number]  [facility] ([id number])  0  30 0 MME  Private Pay  [place]        Active agreement on file -Yes

## 2021-07-01 DIAGNOSIS — M51.9 LUMBAR DISC DISEASE: ICD-10-CM

## 2021-07-01 RX ORDER — OXYCODONE HYDROCHLORIDE AND ACETAMINOPHEN 5; 325 MG/1; MG/1
TABLET ORAL
Qty: 120 TABLET | Refills: 0 | OUTPATIENT
Start: 2021-07-01

## 2021-07-02 RX ORDER — OXYCODONE HYDROCHLORIDE AND ACETAMINOPHEN 5; 325 MG/1; MG/1
TABLET ORAL
Qty: 120 TABLET | Refills: 0 | OUTPATIENT
Start: 2021-07-02

## 2021-07-03 ENCOUNTER — TELEPHONE (OUTPATIENT)
Dept: OTHER | Facility: OTHER | Age: 56
End: 2021-07-03

## 2021-07-03 NOTE — TELEPHONE ENCOUNTER
Patient called to refill his Oxycodone  Patient informed that his refill was denied since he has not been seen within the past 3 months  Patient advised to call the office during the week to schedule an appointment  No refills pending at this time

## 2021-07-06 DIAGNOSIS — M51.9 LUMBAR DISC DISEASE: ICD-10-CM

## 2021-07-06 RX ORDER — OXYCODONE HYDROCHLORIDE AND ACETAMINOPHEN 5; 325 MG/1; MG/1
TABLET ORAL
Qty: 120 TABLET | Refills: 0 | Status: SHIPPED | OUTPATIENT
Start: 2021-07-06 | End: 2021-08-02 | Stop reason: SDUPTHER

## 2021-07-06 NOTE — TELEPHONE ENCOUNTER
05/31/2021 1 05/03/2021   OXYCODONE-ACETAMINOPHEN 5-325  120 0 30 BR JOSH   [id number]  [facility] ([id number]) 0 30 0 MME Private Pay [place]  05/02/2021 1 03/29/2021   OXYCODONE-ACETAMINOPHEN 5-325  120 0 30 BR JOSH   [id number]  [facility] ([id number]) 0 30 0 MME Private Pay [place]  04/05/2021 1 04/05/2021   OXYCODONE-ACETAMINOPHEN 5-325  120 0 30 BR JOSH   [id number]  [facility] (1979) 0 30 0 MME Private Pay [place]  03/03/2021 1 03/01/2021   OXYCODONE-ACETAMINOPHEN 5-325  120 0 30 BR JOSH   [id number]  [facility] ([id number]) 0 30 0 MME Private Pay [place]  02/03/2021 1 02/03/2021   OXYCODONE-ACETAMINOPHEN 5-325  120 0 30 BR JOSH   [id number]  [facility] ([id number]) 0 30 0 MME Private Pay [place]

## 2021-07-06 NOTE — TELEPHONE ENCOUNTER
Pt scheduled 3 mo f/u appt for 07/26/2021 with Dr Muse prior requesting the refill for his  oxyCODONE-acetaminophen (PERCOCET) 5-325 mg per tablet  [facility] declined pts refills, however, he was only requesting them because he is out of med and due       Please advise

## 2021-07-20 ENCOUNTER — OFFICE VISIT (OUTPATIENT)
Dept: FAMILY MEDICINE CLINIC | Facility: CLINIC | Age: 56
End: 2021-07-20
Payer: COMMERCIAL

## 2021-07-20 VITALS
TEMPERATURE: 97.8 F | OXYGEN SATURATION: 96 % | DIASTOLIC BLOOD PRESSURE: 88 MMHG | HEIGHT: 70 IN | HEART RATE: 78 BPM | BODY MASS INDEX: 29.86 KG/M2 | WEIGHT: 208.6 LBS | SYSTOLIC BLOOD PRESSURE: 138 MMHG

## 2021-07-20 DIAGNOSIS — G89.4 CHRONIC PAIN SYNDROME: ICD-10-CM

## 2021-07-20 DIAGNOSIS — Z72.0 TOBACCO USE: ICD-10-CM

## 2021-07-20 DIAGNOSIS — M51.9 LUMBAR DISC DISEASE: ICD-10-CM

## 2021-07-20 DIAGNOSIS — M41.24 OTHER IDIOPATHIC SCOLIOSIS, THORACIC REGION: ICD-10-CM

## 2021-07-20 DIAGNOSIS — R03.0 ELEVATED BLOOD PRESSURE READING: ICD-10-CM

## 2021-07-20 DIAGNOSIS — M19.90 ARTHRITIS: Primary | ICD-10-CM

## 2021-07-20 PROCEDURE — 4004F PT TOBACCO SCREEN RCVD TLK: CPT | Performed by: FAMILY MEDICINE

## 2021-07-20 PROCEDURE — 3725F SCREEN DEPRESSION PERFORMED: CPT | Performed by: FAMILY MEDICINE

## 2021-07-20 PROCEDURE — 99214 OFFICE O/P EST MOD 30 MIN: CPT | Performed by: FAMILY MEDICINE

## 2021-07-20 PROCEDURE — 3008F BODY MASS INDEX DOCD: CPT | Performed by: FAMILY MEDICINE

## 2021-07-20 NOTE — PROGRESS NOTES
Assessment/Plan:         Problem List Items Addressed This Visit        Musculoskeletal and Integument    Lumbar disc disease    Scoliosis deformity of spine       Other    Tobacco use       Continue Chantix  Smoking cessation strongly encouraged         Chronic pain syndrome      Continue Percocet 5 mg 4 daily         Elevated blood pressure reading      Continue monitor blood pressure at home he is getting better readings  Other Visit Diagnoses     Arthritis    -  Primary    Relevant Medications    Diclofenac Sodium (VOLTAREN) 1 %            Subjective:      Patient ID: Kelsey Cardozo is a 54 y o  male  Patient comes in for a checkup  He presently is not taking Percocet because he applied  for a new job  Continues to smoke 6-7 cigarettes per day despite taking Chantix 1 mg b i d  He continues to have back pain  The following portions of the patient's history were reviewed and updated as appropriate:   Past Medical History:  He has no past medical history on file ,  _______________________________________________________________________  Medical Problems:  does not have any pertinent problems on file ,  _______________________________________________________________________  Past Surgical History:   has a past surgical history that includes Hernia repair (09/28/2019)  ,  _______________________________________________________________________  Family History:  family history includes Coronary artery disease in his mother; Hypertension in his mother ,  _______________________________________________________________________  Social History:   reports that he has been smoking cigarettes  He has been smoking about 0 25 packs per day  He has never used smokeless tobacco  He reports that he does not drink alcohol and does not use drugs  ,  _______________________________________________________________________  Allergies:  has No Known Allergies     _______________________________________________________________________  Current Outpatient Medications   Medication Sig Dispense Refill    b complex vitamins tablet Take 1 tablet by mouth daily      Black Currant Seed Oil 500 MG CAPS Take 1 capsule by mouth daily      Cholecalciferol (Vitamin D3) 125 MCG (5000 UT) TABS Take 5,000 Units by mouth daily      Diclofenac Sodium (VOLTAREN) 1 % Apply 2 g topically 4 (four) times a day 240 g 11    Multiple Vitamin (multivitamin) tablet Take 1 tablet by mouth daily      oxyCODONE-acetaminophen (PERCOCET) 5-325 mg per tablet 1 qid for pain 120 tablet 0    saccharomyces boulardii (FLORASTOR) 250 mg capsule Take 250 mg by mouth 2 (two) times a day      Turmeric 500 MG CAPS Take by mouth      varenicline (CHANTIX) 1 mg tablet Take 1 tablet (1 mg total) by mouth 2 (two) times a day 60 tablet 5     No current facility-administered medications for this visit      _______________________________________________________________________  Review of Systems   Constitutional: Negative  Respiratory: Negative  Cardiovascular: Negative  Musculoskeletal: Positive for back pain  Objective:  Vitals:    07/20/21 1033   BP: 138/88   BP Location: Left arm   Patient Position: Sitting   Cuff Size: Large   Pulse: 78   Temp: 97 8 °F (36 6 °C)   TempSrc: Tympanic   SpO2: 96%   Weight: 94 6 kg (208 lb 9 6 oz)   Height: 5' 9 5" (1 765 m)     Body mass index is 30 36 kg/m²  Physical Exam  Constitutional:       Appearance: He is well-developed  HENT:      Head: Normocephalic and atraumatic  Eyes:      Pupils: Pupils are equal, round, and reactive to light  Cardiovascular:      Rate and Rhythm: Normal rate and regular rhythm  Heart sounds: Normal heart sounds  Pulmonary:      Effort: Pulmonary effort is normal       Breath sounds: Normal breath sounds  Abdominal:      General: Bowel sounds are normal       Palpations: Abdomen is soft  Musculoskeletal:      Cervical back: Neck supple  Comments: Scoliosis of thoracic spine  Tender lumbar paraspinal muscles   Skin:     General: Skin is warm and dry  Neurological:      Mental Status: He is alert and oriented to person, place, and time  Psychiatric:         Thought Content:  Thought content normal

## 2021-08-02 DIAGNOSIS — M51.9 LUMBAR DISC DISEASE: ICD-10-CM

## 2021-08-02 NOTE — TELEPHONE ENCOUNTER
T/c from pt -- -med had already been requested via another source and was pending --- added PDMP and routed to [facility]            07/07/2021 1 07/06/2021   OXYCODONE-ACETAMINOPHEN 5-325  120 0 30 BR JOSH   [id number]  PENNS ([id number]) 0 30 0 [facility] Private Pay [place]    05/31/2021 1 05/03/2021   OXYCODONE-ACETAMINOPHEN 5-325  120 0 30 BR JOSH   [id number]  PENNS ([id number]) 0 30 0 [facility] Private Pay [place]      05/02/2021 1 03/29/2021   OXYCODONE-ACETAMINOPHEN 5-325  120 0 30 BR JOSH   [id number]  PENNS ([id number]) 0 30 0 [facility] Private Pay [place]

## 2021-08-03 RX ORDER — OXYCODONE HYDROCHLORIDE AND ACETAMINOPHEN 5; 325 MG/1; MG/1
TABLET ORAL
Qty: 120 TABLET | Refills: 0 | Status: SHIPPED | OUTPATIENT
Start: 2021-08-03 | End: 2021-09-01 | Stop reason: SDUPTHER

## 2021-09-01 DIAGNOSIS — M51.9 LUMBAR DISC DISEASE: ICD-10-CM

## 2021-09-01 NOTE — TELEPHONE ENCOUNTER
08/04/2021 1 08/03/2021   OXYCODONE-ACETAMINOPHEN 5-325  120 0 30 BR JOSH   [id number]  PENNS ([id number]) 0 30 0 [facility] Private Pay [place]    07/07/2021 1 07/06/2021   OXYCODONE-ACETAMINOPHEN 5-325  120 0 30 BR JOSH   [id number]  PENNS ([id number]) 0 30 0 [facility] Private Pay [place]    05/31/2021 1 05/03/2021   OXYCODONE-ACETAMINOPHEN 5-325  120 0 30 BR JOSH   [id number]  PENNS ([id number]) 0 30 0 [facility] Private Pay [place]

## 2021-09-02 RX ORDER — OXYCODONE HYDROCHLORIDE AND ACETAMINOPHEN 5; 325 MG/1; MG/1
TABLET ORAL
Qty: 120 TABLET | Refills: 0 | Status: SHIPPED | OUTPATIENT
Start: 2021-09-02 | End: 2021-10-04 | Stop reason: SDUPTHER

## 2021-10-04 DIAGNOSIS — M51.9 LUMBAR DISC DISEASE: ICD-10-CM

## 2021-10-04 RX ORDER — OXYCODONE HYDROCHLORIDE AND ACETAMINOPHEN 5; 325 MG/1; MG/1
TABLET ORAL
Qty: 120 TABLET | Refills: 0 | Status: SHIPPED | OUTPATIENT
Start: 2021-10-04 | End: 2021-11-01 | Stop reason: SDUPTHER

## 2021-11-01 DIAGNOSIS — M51.9 LUMBAR DISC DISEASE: ICD-10-CM

## 2021-11-01 RX ORDER — OXYCODONE HYDROCHLORIDE AND ACETAMINOPHEN 5; 325 MG/1; MG/1
TABLET ORAL
Qty: 120 TABLET | Refills: 0 | Status: SHIPPED | OUTPATIENT
Start: 2021-11-01 | End: 2021-12-01 | Stop reason: SDUPTHER

## 2021-11-24 ENCOUNTER — OFFICE VISIT (OUTPATIENT)
Dept: FAMILY MEDICINE CLINIC | Facility: CLINIC | Age: 56
End: 2021-11-24
Payer: COMMERCIAL

## 2021-11-24 VITALS
DIASTOLIC BLOOD PRESSURE: 68 MMHG | OXYGEN SATURATION: 98 % | HEART RATE: 64 BPM | HEIGHT: 70 IN | TEMPERATURE: 96.1 F | WEIGHT: 196 LBS | BODY MASS INDEX: 28.06 KG/M2 | SYSTOLIC BLOOD PRESSURE: 142 MMHG

## 2021-11-24 DIAGNOSIS — R03.0 ELEVATED BLOOD PRESSURE READING: ICD-10-CM

## 2021-11-24 DIAGNOSIS — G89.4 CHRONIC PAIN SYNDROME: ICD-10-CM

## 2021-11-24 DIAGNOSIS — E78.5 DYSLIPIDEMIA: Primary | ICD-10-CM

## 2021-11-24 DIAGNOSIS — Z72.0 TOBACCO USE: ICD-10-CM

## 2021-11-24 DIAGNOSIS — M51.9 LUMBAR DISC DISEASE: ICD-10-CM

## 2021-11-24 PROBLEM — R63.5 WEIGHT GAIN: Status: RESOLVED | Noted: 2021-03-29 | Resolved: 2021-11-24

## 2021-11-24 PROCEDURE — 3008F BODY MASS INDEX DOCD: CPT | Performed by: FAMILY MEDICINE

## 2021-11-24 PROCEDURE — 4004F PT TOBACCO SCREEN RCVD TLK: CPT | Performed by: FAMILY MEDICINE

## 2021-11-24 PROCEDURE — 3725F SCREEN DEPRESSION PERFORMED: CPT | Performed by: FAMILY MEDICINE

## 2021-11-24 PROCEDURE — 99214 OFFICE O/P EST MOD 30 MIN: CPT | Performed by: FAMILY MEDICINE

## 2021-12-01 DIAGNOSIS — M51.9 LUMBAR DISC DISEASE: ICD-10-CM

## 2021-12-02 RX ORDER — OXYCODONE HYDROCHLORIDE AND ACETAMINOPHEN 5; 325 MG/1; MG/1
TABLET ORAL
Qty: 120 TABLET | Refills: 0 | Status: SHIPPED | OUTPATIENT
Start: 2021-12-02 | End: 2021-12-30 | Stop reason: SDUPTHER

## 2021-12-06 ENCOUNTER — TELEPHONE (OUTPATIENT)
Dept: FAMILY MEDICINE CLINIC | Facility: CLINIC | Age: 56
End: 2021-12-06

## 2021-12-30 DIAGNOSIS — M51.9 LUMBAR DISC DISEASE: ICD-10-CM

## 2021-12-30 RX ORDER — OXYCODONE HYDROCHLORIDE AND ACETAMINOPHEN 5; 325 MG/1; MG/1
TABLET ORAL
Qty: 120 TABLET | Refills: 0 | Status: SHIPPED | OUTPATIENT
Start: 2021-12-30 | End: 2022-02-01 | Stop reason: SDUPTHER

## 2022-02-01 DIAGNOSIS — M51.9 LUMBAR DISC DISEASE: ICD-10-CM

## 2022-02-01 RX ORDER — OXYCODONE HYDROCHLORIDE AND ACETAMINOPHEN 5; 325 MG/1; MG/1
TABLET ORAL
Qty: 120 TABLET | Refills: 0 | Status: SHIPPED | OUTPATIENT
Start: 2022-02-01 | End: 2022-03-01 | Stop reason: SDUPTHER

## 2022-03-01 DIAGNOSIS — M51.9 LUMBAR DISC DISEASE: ICD-10-CM

## 2022-03-03 ENCOUNTER — TELEPHONE (OUTPATIENT)
Dept: FAMILY MEDICINE CLINIC | Facility: CLINIC | Age: 57
End: 2022-03-03

## 2022-03-03 RX ORDER — OXYCODONE HYDROCHLORIDE AND ACETAMINOPHEN 5; 325 MG/1; MG/1
TABLET ORAL
Qty: 120 TABLET | Refills: 0 | Status: SHIPPED | OUTPATIENT
Start: 2022-03-03 | End: 2022-04-01 | Stop reason: SDUPTHER

## 2022-03-03 NOTE — TELEPHONE ENCOUNTER
Pt called to check on status of earlier requested refill of oxyCODONE-acetaminophen (PERCOCET) 5-325 mg per tablet - pt aware of Juan Manuel Simmons was out of office  Med queued and order is pending as of  03/03/2022  PDMP REVIEWED : YES   PAIN MANAGEMENT ON FILE : YES     Please approve / refuse refill if appropriate

## 2022-03-08 NOTE — TELEPHONE ENCOUNTER
Left detailed vm for pt        E-Prescribing Status: Receipt confirmed by pharmacy (3/3/2022  1:37 PM EST)

## 2022-03-25 ENCOUNTER — TELEPHONE (OUTPATIENT)
Dept: FAMILY MEDICINE CLINIC | Facility: CLINIC | Age: 57
End: 2022-03-25

## 2022-04-01 DIAGNOSIS — M51.9 LUMBAR DISC DISEASE: ICD-10-CM

## 2022-04-03 RX ORDER — OXYCODONE HYDROCHLORIDE AND ACETAMINOPHEN 5; 325 MG/1; MG/1
TABLET ORAL
Qty: 120 TABLET | Refills: 0 | Status: SHIPPED | OUTPATIENT
Start: 2022-04-03 | End: 2022-05-05 | Stop reason: SDUPTHER

## 2022-05-02 DIAGNOSIS — M51.9 LUMBAR DISC DISEASE: ICD-10-CM

## 2022-05-02 RX ORDER — OXYCODONE HYDROCHLORIDE AND ACETAMINOPHEN 5; 325 MG/1; MG/1
TABLET ORAL
Qty: 120 TABLET | Refills: 0 | OUTPATIENT
Start: 2022-05-02

## 2022-05-02 NOTE — TELEPHONE ENCOUNTER
Patient calling to inquire about status of med refill of oxyCODONE-acetaminophen (PERCOCET) 5-325 mg per tablet  He was advised meds has been requested earlier and script is awaiting Dr Muse approval and it usually takes up to 24 hours  He agrees  Meds queued

## 2022-05-04 DIAGNOSIS — M51.9 LUMBAR DISC DISEASE: ICD-10-CM

## 2022-05-04 RX ORDER — OXYCODONE HYDROCHLORIDE AND ACETAMINOPHEN 5; 325 MG/1; MG/1
TABLET ORAL
Qty: 120 TABLET | Refills: 0 | OUTPATIENT
Start: 2022-05-04

## 2022-05-04 NOTE — TELEPHONE ENCOUNTER
Patient needs appointment for checkup with urine drug screen  I will refill medication once this appointment is made

## 2022-05-04 NOTE — TELEPHONE ENCOUNTER
T/c from pt -- Pt called again this am to see if script was sent to pharmacy  Has not heard anything back  Asking if Dr Debbi Melgar is able to send in the medication      Please advise

## 2022-05-04 NOTE — TELEPHONE ENCOUNTER
Pt scheduled for June 8th, is asking for refill to hold him over until he can come in    Please advise

## 2022-05-05 DIAGNOSIS — M51.9 LUMBAR DISC DISEASE: ICD-10-CM

## 2022-05-05 RX ORDER — OXYCODONE HYDROCHLORIDE AND ACETAMINOPHEN 5; 325 MG/1; MG/1
TABLET ORAL
Qty: 120 TABLET | Refills: 0 | Status: SHIPPED | OUTPATIENT
Start: 2022-05-05 | End: 2022-06-03 | Stop reason: SDUPTHER

## 2022-06-03 DIAGNOSIS — M51.9 LUMBAR DISC DISEASE: ICD-10-CM

## 2022-06-05 RX ORDER — OXYCODONE HYDROCHLORIDE AND ACETAMINOPHEN 5; 325 MG/1; MG/1
TABLET ORAL
Qty: 120 TABLET | Refills: 0 | Status: SHIPPED | OUTPATIENT
Start: 2022-06-05 | End: 2022-06-08 | Stop reason: SDUPTHER

## 2022-06-08 ENCOUNTER — TELEPHONE (OUTPATIENT)
Dept: FAMILY MEDICINE CLINIC | Facility: CLINIC | Age: 57
End: 2022-06-08

## 2022-06-08 ENCOUNTER — OFFICE VISIT (OUTPATIENT)
Dept: FAMILY MEDICINE CLINIC | Facility: CLINIC | Age: 57
End: 2022-06-08
Payer: COMMERCIAL

## 2022-06-08 VITALS
BODY MASS INDEX: 28.58 KG/M2 | WEIGHT: 193 LBS | DIASTOLIC BLOOD PRESSURE: 88 MMHG | HEART RATE: 71 BPM | HEIGHT: 69 IN | TEMPERATURE: 97.4 F | SYSTOLIC BLOOD PRESSURE: 138 MMHG | OXYGEN SATURATION: 98 %

## 2022-06-08 DIAGNOSIS — F11.20 CONTINUOUS OPIOID DEPENDENCE (HCC): ICD-10-CM

## 2022-06-08 DIAGNOSIS — Z00.00 HEALTH MAINTENANCE EXAMINATION: ICD-10-CM

## 2022-06-08 DIAGNOSIS — Z72.0 TOBACCO USE: ICD-10-CM

## 2022-06-08 DIAGNOSIS — M51.9 LUMBAR DISC DISEASE: ICD-10-CM

## 2022-06-08 DIAGNOSIS — E78.5 DYSLIPIDEMIA: ICD-10-CM

## 2022-06-08 DIAGNOSIS — M51.9 THORACIC DISC DISEASE: ICD-10-CM

## 2022-06-08 DIAGNOSIS — G89.4 CHRONIC PAIN SYNDROME: ICD-10-CM

## 2022-06-08 DIAGNOSIS — M51.9 LUMBAR DISC DISEASE: Primary | ICD-10-CM

## 2022-06-08 DIAGNOSIS — R03.0 ELEVATED BLOOD PRESSURE READING: ICD-10-CM

## 2022-06-08 PROCEDURE — 4004F PT TOBACCO SCREEN RCVD TLK: CPT | Performed by: FAMILY MEDICINE

## 2022-06-08 PROCEDURE — 3008F BODY MASS INDEX DOCD: CPT | Performed by: FAMILY MEDICINE

## 2022-06-08 PROCEDURE — 99396 PREV VISIT EST AGE 40-64: CPT | Performed by: FAMILY MEDICINE

## 2022-06-08 RX ORDER — NICOTINE 21 MG/24HR
1 PATCH, TRANSDERMAL 24 HOURS TRANSDERMAL EVERY 24 HOURS
Qty: 30 PATCH | Refills: 5 | Status: SHIPPED | OUTPATIENT
Start: 2022-06-08 | End: 2022-06-08 | Stop reason: SDUPTHER

## 2022-06-08 RX ORDER — OXYCODONE HYDROCHLORIDE AND ACETAMINOPHEN 5; 325 MG/1; MG/1
TABLET ORAL
Qty: 120 TABLET | Refills: 0 | Status: SHIPPED | OUTPATIENT
Start: 2022-06-08 | End: 2022-06-08 | Stop reason: SDUPTHER

## 2022-06-08 NOTE — PROGRESS NOTES
BMI Counseling: Body mass index is 28 5 kg/m²  The BMI is above normal  Nutrition recommendations include decreasing portion sizes and moderation in carbohydrate intake  Exercise recommendations include exercising 3-5 times per week  No pharmacotherapy was ordered  Rationale for BMI follow-up plan is due to patient being overweight or obese  Assessment/Plan:    Return visit in 4 months  He had colonoscopy done through his work  He will bring us a copy of the report  Problem List Items Addressed This Visit        Musculoskeletal and Integument    Lumbar disc disease - Primary    Relevant Medications    oxyCODONE-acetaminophen (PERCOCET) 5-325 mg per tablet    Other Relevant Orders    [facility] All Prescribed Meds    Amphetamines, Methamphetamines    Butalbital    Phenobarbital    Secobarbital    Temazepam    Alprazolam    Clonazepam    Diazepam    Lorazepam    Oxazepam    Gabapentin    Pregabalin    Cocaine    Heroin    Buprenorphine    Levorphanol    Meperidine    Naltrexone    Fentanyl    Methadone    Oxycodone    Oxymorphone    Tapentadol    Tramadol    Codeine, Hydrocodone, Hydropmorphone, Morphine    Bath Salts    Kratom    Spice    Methylphenidate    Phentermine    Validity Oxidant    Validity Creatinine    Validity pH    Validity Specific    Thoracic disc disease       Other    Health maintenance examination    Tobacco use    Relevant Medications    nicotine (NICODERM CQ) 21 mg/24 hr TD 24 hr patch    Chronic pain syndrome    Elevated blood pressure reading    Dyslipidemia    Continuous opioid dependence (HCC)            Subjective:      Patient ID: Kelsi Brannon is a 64 y o  male  Patient comes in for checkup   Continues to have mid and low back pain  He has been out of Percocet for over 2 weeks  Wishes to quit smoking  He did not get relief with nicotine gum        The following portions of the patient's history were reviewed and updated as appropriate:   Past Medical History:  He has no past medical history on file ,  _______________________________________________________________________  Medical Problems:  does not have any pertinent problems on file ,  _______________________________________________________________________  Past Surgical History:   has a past surgical history that includes Hernia repair (09/28/2019)  ,  _______________________________________________________________________  Family History:  family history includes Coronary artery disease in his mother; Hypertension in his mother ,  _______________________________________________________________________  Social History:   reports that he has been smoking cigarettes  He has been smoking about 0 25 packs per day  He has never used smokeless tobacco  He reports that he does not drink alcohol and does not use drugs  ,  _______________________________________________________________________  Allergies:  has No Known Allergies     _______________________________________________________________________  Current Outpatient Medications   Medication Sig Dispense Refill    b complex vitamins tablet Take 1 tablet by mouth daily      Black Currant Seed Oil 500 MG CAPS Take 1 capsule by mouth daily      Cholecalciferol (Vitamin D3) 125 MCG (5000 UT) TABS Take 5,000 Units by mouth daily      Diclofenac Sodium (VOLTAREN) 1 % Apply 2 g topically 4 (four) times a day 240 g 11    Multiple Vitamin (multivitamin) tablet Take 1 tablet by mouth daily      nicotine (NICODERM CQ) 21 mg/24 hr TD 24 hr patch Place 1 patch on the skin every 24 hours 30 patch 5    oxyCODONE-acetaminophen (PERCOCET) 5-325 mg per tablet 1 qid for pain 120 tablet 0    saccharomyces boulardii (FLORASTOR) 250 mg capsule Take 250 mg by mouth 2 (two) times a day      Turmeric 500 MG CAPS Take by mouth       No current facility-administered medications for this visit      _______________________________________________________________________  Review of Systems Constitutional: Negative  Respiratory: Negative  Cardiovascular: Negative  Gastrointestinal: Negative  Musculoskeletal: Positive for back pain  Objective:  Vitals:    06/08/22 0840   BP: 138/88   BP Location: Left arm   Patient Position: Sitting   Cuff Size: Standard   Pulse: 71   Temp: (!) 97 4 °F (36 3 °C)   SpO2: 98%   Weight: 87 5 kg (193 lb)   Height: 5' 9" (1 753 m)     Body mass index is 28 5 kg/m²  Physical Exam  Constitutional:       Appearance: Normal appearance  He is well-developed  HENT:      Head: Normocephalic and atraumatic  Eyes:      Pupils: Pupils are equal, round, and reactive to light  Cardiovascular:      Rate and Rhythm: Normal rate and regular rhythm  Heart sounds: Normal heart sounds  Pulmonary:      Effort: Pulmonary effort is normal       Breath sounds: Normal breath sounds  Abdominal:      General: Bowel sounds are normal       Palpations: Abdomen is soft  Musculoskeletal:         General: Normal range of motion  Cervical back: Neck supple  Skin:     General: Skin is warm and dry  Neurological:      Mental Status: He is alert  Psychiatric:         Mood and Affect: Mood normal          Behavior: Behavior normal          Thought Content:  Thought content normal

## 2022-06-08 NOTE — TELEPHONE ENCOUNTER
T/c from pt -- went to pharmacy (is still in parking lot) to  his pain medication and was told that Dr Samir Kingsley sent in a script today and on 6/5, which the pt didn't know was sent in  He is being told now that the pharmacy has cancelled the scripts and was also told he is not permitted to self pay for his meds, which he usually does  Would like Dr Samir Kingsley' advice/assistance

## 2022-06-08 NOTE — PATIENT INSTRUCTIONS

## 2022-06-09 RX ORDER — OXYCODONE HYDROCHLORIDE AND ACETAMINOPHEN 5; 325 MG/1; MG/1
TABLET ORAL
Qty: 120 TABLET | Refills: 0 | Status: SHIPPED | OUTPATIENT
Start: 2022-06-09 | End: 2022-07-05 | Stop reason: SDUPTHER

## 2022-06-09 RX ORDER — NICOTINE 21 MG/24HR
1 PATCH, TRANSDERMAL 24 HOURS TRANSDERMAL EVERY 24 HOURS
Qty: 30 PATCH | Refills: 5 | Status: SHIPPED | OUTPATIENT
Start: 2022-06-09

## 2022-06-10 LAB
6MAM UR QL CFM: NEGATIVE NG/ML
7AMINOCLONAZEPAM UR QL CFM: NEGATIVE NG/ML
A-OH ALPRAZ UR QL CFM: NEGATIVE NG/ML
ACCEPTABLE CREAT UR QL: NORMAL MG/DL
ACCEPTIBLE SP GR UR QL: NORMAL
AMPHET UR QL CFM: NEGATIVE NG/ML
BUPRENORPHINE UR QL CFM: NEGATIVE NG/ML
BUTALBITAL UR QL CFM: NEGATIVE NG/ML
BZE UR QL CFM: NEGATIVE NG/ML
CODEINE UR QL CFM: NEGATIVE NG/ML
EDDP UR QL CFM: NEGATIVE NG/ML
EUTYLONE UR QL: NEGATIVE NG/ML
FENTANYL UR QL CFM: NEGATIVE NG/ML
GLIADIN IGG SER IA-ACNC: NEGATIVE NG/ML
HYDROCODONE UR QL CFM: NEGATIVE NG/ML
HYDROMORPHONE UR QL CFM: NEGATIVE NG/ML
LORAZEPAM UR QL CFM: NEGATIVE NG/ML
ME-PHENIDATE UR QL CFM: NEGATIVE NG/ML
MEPERIDINE UR QL CFM: NEGATIVE NG/ML
METHADONE UR QL CFM: NEGATIVE NG/ML
METHAMPHET UR QL CFM: NEGATIVE NG/ML
MORPHINE UR QL CFM: NEGATIVE NG/ML
NALTREXONE UR QL CFM: NEGATIVE NG/ML
NITRITE UR QL: NORMAL UG/ML
NORBUPRENORPHINE UR QL CFM: NEGATIVE NG/ML
NORDIAZEPAM UR QL CFM: NEGATIVE NG/ML
NORFENTANYL UR QL CFM: NEGATIVE NG/ML
NORHYDROCODONE UR QL CFM: NEGATIVE NG/ML
NORMEPERIDINE UR QL CFM: NEGATIVE NG/ML
NOROXYCODONE UR QL CFM: NORMAL NG/ML
OXAZEPAM UR QL CFM: NEGATIVE NG/ML
OXYCODONE UR QL CFM: NORMAL NG/ML
OXYMORPHONE UR QL CFM: NORMAL NG/ML
OXYMORPHONE UR QL CFM: NORMAL NG/ML
PHENOBARB UR QL CFM: NEGATIVE NG/ML
RESULT ALL_PRESCRIBED MEDS AND SPECIAL INSTRUCTIONS: NORMAL
SECOBARBITAL UR QL CFM: NEGATIVE NG/ML
SL AMB 3-METHYL-FENTANYL QUANTIFICATION: NORMAL NG/ML
SL AMB 4-ANPP QUANTIFICATION: NORMAL NG/ML
SL AMB 4-FIBF QUANTIFICATION: NORMAL NG/ML
SL AMB 5F-ADB-M7 METABOLITE QUANTIFICATION: NEGATIVE NG/ML
SL AMB 7-OH-MITRAGYNINE (KRATOM ALKALOID) QUANTIFICATION: NEGATIVE NG/ML
SL AMB AB-FUBINACA-M3 METABOLITE QUANTIFICATION: NEGATIVE NG/ML
SL AMB ACETYL FENTANYL QUANTIFICATION: NORMAL NG/ML
SL AMB ACETYL NORFENTANYL QUANTIFICATION: NORMAL NG/ML
SL AMB ACRYL FENTANYL QUANTIFICATION: NORMAL NG/ML
SL AMB BUTRYL FENTANYL QUANTIFICATION: NORMAL NG/ML
SL AMB CARFENTANIL QUANTIFICATION: NORMAL NG/ML
SL AMB CYCLOPROPYL FENTANYL QUANTIFICATION: NORMAL NG/ML
SL AMB DEXTRORPHAN (DEXTROMETHORPHAN METABOLITE) QUANT: NEGATIVE NG/ML
SL AMB FURANYL FENTANYL QUANTIFICATION: NORMAL NG/ML
SL AMB GABAPENTIN QUANTIFICATION: NEGATIVE
SL AMB JWH018 METABOLITE QUANTIFICATION: NEGATIVE NG/ML
SL AMB JWH073 METABOLITE QUANTIFICATION: NEGATIVE NG/ML
SL AMB MDMB-FUBINACA-M1 METABOLITE QUANTIFICATION: NEGATIVE NG/ML
SL AMB METHOXYACETYL FENTANYL QUANTIFICATION: NORMAL NG/ML
SL AMB METHYLONE QUANTIFICATION: NEGATIVE NG/ML
SL AMB N-DESMETHYL U-47700 QUANTIFICATION: NORMAL NG/ML
SL AMB N-DESMETHYL-TRAMADOL QUANTIFICATION: NEGATIVE NG/ML
SL AMB PHENTERMINE QUANTIFICATION: NEGATIVE NG/ML
SL AMB PREGABALIN QUANTIFICATION: NEGATIVE
SL AMB RCS4 METABOLITE QUANTIFICATION: NEGATIVE NG/ML
SL AMB RITALINIC ACID QUANTIFICATION: NEGATIVE NG/ML
SL AMB U-47700 QUANTIFICATION: NORMAL NG/ML
SMOOTH MUSCLE AB TITR SER IF: NEGATIVE NG/ML
SPECIMEN DRAWN SERPL: NEGATIVE NG/ML
SPECIMEN PH ACCEPTABLE UR: NORMAL
TAPENTADOL UR QL CFM: NEGATIVE NG/ML
TEMAZEPAM UR QL CFM: NEGATIVE NG/ML
TEMAZEPAM UR QL CFM: NEGATIVE NG/ML
TRAMADOL UR QL CFM: NEGATIVE NG/ML
URATE/CREAT 24H UR: NEGATIVE NG/ML

## 2022-07-05 DIAGNOSIS — M51.9 LUMBAR DISC DISEASE: ICD-10-CM

## 2022-07-05 RX ORDER — OXYCODONE HYDROCHLORIDE AND ACETAMINOPHEN 5; 325 MG/1; MG/1
TABLET ORAL
Qty: 120 TABLET | Refills: 0 | Status: SHIPPED | OUTPATIENT
Start: 2022-07-05 | End: 2022-08-02 | Stop reason: SDUPTHER

## 2022-07-05 NOTE — TELEPHONE ENCOUNTER
1 [id number] 06/09/2022 06/09/2022 oxyCODONE HCL-ACETAMINOPHEN (Tablet) 120 0 30 325 MG-5 MG 30 0 [facility]  Private Pay 0 / 0 PA    1 [id number] 05/05/2022 05/05/2022 oxyCODONE HCL-ACETAMINOPHEN (Tablet) 120 0 30 325 MG-5 MG 30 0 [facility]  Private Pay 00 / 0 PA    1 [id number] 04/03/2022 04/03/2022 oxyCODONE HCL-ACETAMINOPHEN (Tablet) 120 0 30 325 MG-5 MG 30 0 [facility]  Private Pay 00 / 0 PA    1 [id number] 03/03/2022 03/03/2022 oxyCODONE HCL-ACETAMINOPHEN (Tablet) 120 0 30 325 MG-5 MG 30 0 [facility]   Private Pay 00 / 0 PA

## 2022-07-25 ENCOUNTER — TELEPHONE (OUTPATIENT)
Dept: FAMILY MEDICINE CLINIC | Facility: CLINIC | Age: 57
End: 2022-07-25

## 2022-07-25 DIAGNOSIS — M51.9 LUMBAR DISC DISEASE: Primary | ICD-10-CM

## 2022-07-25 NOTE — TELEPHONE ENCOUNTER
T/c from pt - pt asking if Dr Frankie Green can give him steroid injection for his scoliosis or if he can recommend and ortho that is able to do so

## 2022-08-02 ENCOUNTER — TELEPHONE (OUTPATIENT)
Dept: FAMILY MEDICINE CLINIC | Facility: CLINIC | Age: 57
End: 2022-08-02

## 2022-08-02 DIAGNOSIS — M51.9 LUMBAR DISC DISEASE: ICD-10-CM

## 2022-08-03 RX ORDER — OXYCODONE HYDROCHLORIDE AND ACETAMINOPHEN 5; 325 MG/1; MG/1
TABLET ORAL
Qty: 120 TABLET | Refills: 0 | Status: SHIPPED | OUTPATIENT
Start: 2022-08-03 | End: 2022-09-07 | Stop reason: SDUPTHER

## 2022-08-08 NOTE — TELEPHONE ENCOUNTER
T/c from pt - pt states he went to pickup meds and [facility] pharmacy [phone] requires a "cash" approval for  - his insurance doesn't cover the meds so he always pays out of pocket but they won't dispense without the approval from Dr Sky Trejo

## 2022-08-09 NOTE — TELEPHONE ENCOUNTER
I spoke with [facility] and gave the verbal ok on behalf of Dr Tejada for pt to pay OOP  They will get pt's script ready for

## 2022-08-29 ENCOUNTER — TELEPHONE (OUTPATIENT)
Dept: FAMILY MEDICINE CLINIC | Facility: CLINIC | Age: 57
End: 2022-08-29

## 2022-08-29 NOTE — TELEPHONE ENCOUNTER
T/c from pt -- was told by pain mgmt, who Dr Mk Anguiano referred him to, that he needed an insurance referral for [facility] to be entered through [facility] -- entered referral thru [facility] and printed copy for chart

## 2022-09-07 DIAGNOSIS — M51.9 LUMBAR DISC DISEASE: ICD-10-CM

## 2022-09-08 RX ORDER — OXYCODONE HYDROCHLORIDE AND ACETAMINOPHEN 5; 325 MG/1; MG/1
TABLET ORAL
Qty: 120 TABLET | Refills: 0 | Status: SHIPPED | OUTPATIENT
Start: 2022-09-08 | End: 2022-10-04 | Stop reason: SDUPTHER

## 2022-10-04 DIAGNOSIS — M51.9 LUMBAR DISC DISEASE: ICD-10-CM

## 2022-10-05 RX ORDER — OXYCODONE HYDROCHLORIDE AND ACETAMINOPHEN 5; 325 MG/1; MG/1
TABLET ORAL
Qty: 120 TABLET | Refills: 0 | Status: SHIPPED | OUTPATIENT
Start: 2022-10-05

## 2022-10-12 ENCOUNTER — CONSULT (OUTPATIENT)
Dept: PAIN MEDICINE | Facility: CLINIC | Age: 57
End: 2022-10-12
Payer: COMMERCIAL

## 2022-10-12 VITALS
SYSTOLIC BLOOD PRESSURE: 163 MMHG | HEART RATE: 62 BPM | BODY MASS INDEX: 28.65 KG/M2 | WEIGHT: 194 LBS | DIASTOLIC BLOOD PRESSURE: 86 MMHG

## 2022-10-12 DIAGNOSIS — M51.14 INTERVERTEBRAL DISC DISORDER WITH RADICULOPATHY OF THORACIC REGION: Primary | ICD-10-CM

## 2022-10-12 DIAGNOSIS — M51.9 LUMBAR DISC DISEASE: ICD-10-CM

## 2022-10-12 PROCEDURE — 99244 OFF/OP CNSLTJ NEW/EST MOD 40: CPT | Performed by: ANESTHESIOLOGY

## 2022-10-12 NOTE — PROGRESS NOTES
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    2  Lumbar disc disease        Plan  The patient's symptoms, history/physical are consistent with pain that is multifactorial in origin but predominantly the result of a thoracic radiculopathy from his multiple thoracic disc protrusions which is leading to radiating symptoms into his left ribs  At this time, I will order an updated MRI of the thoracic spine to evaluate further  I advised we will call with the results and discuss treatment moving forward which will likely include performing a thoracic epidural steroid injection versus intercostal nerve blocks  My impressions and treatment recommendations were discussed in detail with the patient who verbalized understanding and had no further questions  Discharge instructions were provided  I personally saw and examined the patient and I agree with the above discussed plan of care  Orders Placed This Encounter   Procedures   • MRI thoracic spine wo contrast     Standing Status:   Future     Standing Expiration Date:   10/12/2026     Scheduling Instructions: There is no preparation for this test  Please leave your jewelry and valuables at home, wedding rings are the exception  Please bring your physician order, insurance cards, a form of photo ID and a list of your medications with you  Arrive 15 minutes prior to your appointment time in order to       register  Please bring any prior CT or MRI studies of this area that were not performed at a [facility] facility  To schedule this appointment, please contact Central Scheduling at [phone]  Order Specific Question:   What is the patient's sedation requirement? Answer:   No Sedation     No orders of the defined types were placed in this encounter        History of Present Illness    Taniya Dale is a 62 y o  male referred by Dr Clem Rose who presents for consultation in regards to left-sided rib pain that has been present for several years but worse over the last 6 months  He denies any precipitating injury or trauma  Pain symptoms are moderate to severe rated 7/10 on numeric rating scale and felt intermittently  Pain symptoms are throbbing shooting sharp with pins and needles  Symptoms are aggravated lying down, standing, bending  Treatment history has included topical Voltaren gel which is helpful  Reports that he saw a physician several years ago recommend proceeding with nerve block injections but he hesitated scheduling that  I have personally reviewed and/or updated the patient's past medical history, past surgical history, family history, social history, current medications, allergies, and vital signs today  Review of Systems   Constitutional: Negative for fever and unexpected weight change  HENT: Negative for trouble swallowing  Eyes: Negative for visual disturbance  Respiratory: Negative for shortness of breath and wheezing  Cardiovascular: Negative for chest pain and palpitations  Gastrointestinal: Negative for constipation, diarrhea, nausea and vomiting  Endocrine: Negative for cold intolerance, heat intolerance and polydipsia  Genitourinary: Negative for difficulty urinating and frequency  Musculoskeletal: Positive for back pain, gait problem and joint swelling  Negative for arthralgias and myalgias  Skin: Negative for rash  Neurological: Negative for dizziness, seizures, syncope, weakness and headaches  Hematological: Does not bruise/bleed easily  Psychiatric/Behavioral: Negative for dysphoric mood  All other systems reviewed and are negative        Patient Active Problem List   Diagnosis   • Health maintenance examination   • Lumbar disc disease   • Family history of premature CAD   • Tobacco use   • Thoracic disc disease   • Chronic pain syndrome   • Scoliosis deformity of spine   • Elevated blood pressure reading   • Dyslipidemia   • Continuous opioid dependence ([facility] [address] )       No past medical history on file  Past Surgical History:   Procedure Laterality Date   • HERNIA REPAIR  09/28/2019       Family History   Problem Relation Age of Onset   • Hypertension Mother    • Coronary artery disease Mother        Social History     Occupational History   • Not on file   Tobacco Use   • Smoking status: Current Every Day Smoker     Packs/day: 0 25     Types: Cigarettes   • Smokeless tobacco: Never Used   Vaping Use   • Vaping Use: Never used   Substance and Sexual Activity   • Alcohol use: No   • Drug use: No   • Sexual activity: Not on file       Current Outpatient Medications on File Prior to Visit   Medication Sig   • oxyCODONE-acetaminophen (PERCOCET) 5-325 mg per tablet 1 qid for pain   • b complex vitamins tablet Take 1 tablet by mouth daily   • Black Currant Seed Oil 500 MG CAPS Take 1 capsule by mouth daily   • Cholecalciferol (Vitamin D3) 125 MCG (5000 UT) TABS Take 5,000 Units by mouth daily   • Diclofenac Sodium (VOLTAREN) 1 % Apply 2 g topically 4 (four) times a day   • Multiple Vitamin (multivitamin) tablet Take 1 tablet by mouth daily   • saccharomyces boulardii (FLORASTOR) 250 mg capsule Take 250 mg by mouth 2 (two) times a day   • Turmeric 500 MG CAPS Take by mouth   • [DISCONTINUED] nicotine (NICODERM CQ) 21 mg/24 hr TD 24 hr patch Place 1 patch on the skin every 24 hours     No current facility-administered medications on file prior to visit  No Known Allergies    Physical Exam    /86   Pulse 62   Wt 88 kg (194 lb)   BMI 28 65 kg/m²     Constitutional: normal, well developed, well nourished, alert, in no distress and non-toxic and no overt pain behavior    Eyes: anicteric  HEENT: grossly intact  Neck: supple, symmetric, trachea midline and no masses   Pulmonary:even and unlabored  Cardiovascular:No edema or pitting edema present  Skin:Normal without rashes or lesions and well hydrated  Psychiatric:Mood and affect appropriate  Neurologic:Cranial Nerves II-XII grossly intact  Musculoskeletal:normal     Thoracic Spine Exam  Appearance:  Scoliosis  Palpation/Tenderness:  left thoracic paraspinal tenderness  left thoracic spasm  Range of Motion:  Flexion:  No limitation  with pain  Extension:  No limitation  with pain  Lateral Flexion - Left:  No limitation  with pain  Lateral Flexion - Right:  No limitation  with pain  Motor Strength:  Left hip flexion:  5/5  Left hip extension:  5/5  Right hip flexion:  5/5  Right hip extension:  5/5  Left knee flexion:  5/5  Left knee extension:  5/5  Right knee flexion:  5/5  Right knee extension:  5/5  Left foot dorsiflexion:  5/5  Left foot plantar flexion:  5/5  Right foot dorsiflexion:  5/5  Right foot plantar flexion:  5/5  Reflexes:  Left Patellar:  1+   Right Patellar:  1+   Left Achilles:  1+   Right Achilles:  1+     Imaging    MRI Thoracic Spine (11/1/2017)    Noncontrast MRI of the thoracic spine utilizing a 1 5 Kami high   field strength magnet performed on November 1, 2017 at 9:54 AM are   smooth review  Sagittal T1, T2, inversion recovery and axial T2-weighted imaging   was performed  The study is done in conjunction with an MRI lumbar spine which is   reported separately  History: 12-year-old male  Low back pain  Findings: There is dextroscoliosis of the thoracic spine  Vertebral body heights are maintained  The posterior elements are intact  The facet joints are   appropriately aligned  The thoracic cord caliber and signal intensity are normally   maintained  There is no mass at the spinal cord  There is no   intradural or epidural mass  The C7/T1 interspace is suboptimally demonstrated  At T1/T2 disc contour is maintained  At T2/T3 disc contour is maintained  At T3/T4 disc contour is maintained  At T4/T5 disc contour is maintained  At T5/T6 disc contour is maintained       At T6/T7 a disc bulge results in mild narrowing of the central   canal  There is no neural from stenosis  At T7/T8 disc contour is maintained  At T8/T9 a disc bulge results in mild narrowing of central canal    There is mild narrowing at the floor the left neural foramen  At T9/T10 a disc bulge results in mild narrowing of central canal   and mild to moderate narrowing of the left neural foramen  At T10/T11 a disc bulge eccentric to the left results in moderate   narrowing of the left neural foramen  At T11/T12 disc contour is maintained  At T12/L1 disc contour is maintained  The paravertebral soft tissues appear normal      The lung bases are clear  No pleural effusion is seen

## 2022-10-12 NOTE — PATIENT INSTRUCTIONS
Core Strengthening Exercises   AMBULATORY CARE:   What you need to know about core strengthening exercises: Your core includes the muscles of your lower back, hip, pelvis, and abdomen  Core strengthening exercises help heal and strengthen these muscles  This helps prevent another injury, and keeps your pelvis, spine, and hips in the correct position  Contact your healthcare provider if:   You have sharp or worsening pain during exercise or at rest     You have questions or concerns about your shoulder exercises  Safety tips:  Talk to your healthcare provider before you start an exercise program  A physical therapist can teach you how to do core strengthening exercises safely  Do the exercises on a mat or firm surface  A firm surface will support your spine and prevent low back pain  Do not do these exercises on a bed  Move slowly and smoothly  Avoid fast or jerky motions  Stop if you feel pain  Core exercises should not be painful  Stop if you feel pain  Breathe normally during core exercises  Do not hold your breath  This may cause an increase in blood pressure and prevent muscle strengthening  Your healthcare provider will tell you when to inhale and exhale during the exercise  Begin all of your exercises with abdominal bracing  Abdominal bracing helps warm up your core muscles  You can also practice abdominal bracing throughout the day  Lie on your back with your knees bent and feet flat on the floor  Place your arms in a relaxed position beside your body  Tighten your abdominal muscles  Pull your belly button in and up toward your spine  Hold for 5 seconds  Relax your muscles  Repeat 10 times  Core strengthening exercises: Your healthcare provider will tell you how often to do these exercises  The provider will also tell you how many repetitions of each exercise you should do  Hold each exercise for 5 seconds or as directed   As you get stronger, increase your hold to 10 to 15 seconds  You can do some of these exercises on a stability ball, or with a weight  Ask your healthcare provider how to use a stability ball or weight for these exercises:  Bridging:  Lie on your back with your knees bent and feet flat on the floor  Rest your arms at your side  Tighten your buttocks, and then lift your hips 1 inch off the floor  Hold for 5 seconds  When you can do this exercise without pain for 10 seconds, increase the distance you lift your hips  A good goal is to be able to lift your hips so that your shoulders, hips, and knees are in a straight line  Dead bug:  Lie on your back with your knees bent and feet flat on the floor  Place your arms in a relaxed position beside your body  Begin with abdominal bracing  Next, raise one leg, keeping your knee bent  Hold for 5 seconds  Repeat with the other leg  When you can do this exercise without pain for 10 to 15 seconds, you may raise one straight leg and hold  Repeat with the other leg  Quadruped:  Place your hands and knees on the floor  Keep your wrists directly below your shoulders and your knees directly below your hips  Pull your belly button in toward your spine  Do not flatten or arch your back  Tighten your abdominal muscles below your belly button  Hold for 5 seconds  When you can do this exercise without pain for 10 to 15 seconds, you may extend one arm and hold  Repeat on the other side  Side bridge exercises:      Standing side bridge:  Stand next to a wall and extend one arm toward the wall  Place your palm flat on the wall with your fingers pointing upward  Begin with abdominal bracing  Next, without moving your feet, slowly bend your arm to 90 degrees  Hold for 5 seconds  Repeat on the other side  When you can do this exercise without pain for 10 to 15 seconds, you may do the bent leg side bridge on the floor  Bent leg side bridge:  Lie on one side with your legs, hips, and shoulders in a straight line  Prop yourself up onto your forearm so your elbow is directly below your shoulder  Bend your knees back to 90 degrees  Begin with abdominal bracing  Next, lift your hips and balance yourself on your forearm and knees  Hold for 5 seconds  Repeat on the other side  When you can do this exercise without pain for 10 to 15 seconds, you may do the straight leg side bridge on the floor  Straight leg side bridge:  Lie on one side with your legs, hips, and shoulders in a straight line  Prop yourself up onto your forearm so your elbow is directly below your shoulder  Begin with abdominal bracing  Lift your hips off the floor and balance yourself on your forearm and the outside of your flexed foot  Do not let your ankle bend sideways  Hold for 5 seconds  Repeat on the other side  When you can do this exercise without pain for 10 to 15 seconds, ask your healthcare provider for more advanced exercises  Superman:  Lie on your stomach  Extend your arms forward on the floor  Tighten your abdominal muscles and lift your right hand and left leg off the floor  Hold this position  Slowly return to the starting position  Tighten your abdominal muscles and lift your left hand and right leg off the floor  Hold this position  Slowly return to the starting position  Clam:  Lie on your side with your knees bent  Put your bottom arm under your head to keep your neck in line  Put your top hand on your hip to keep your pelvis from moving  Put your heels together, and keep them together during this exercise  Slowly raise your top knee toward the ceiling  Then lower your leg so your knees are together  Repeat this exercise 10 times  Then switch sides and do the exercise 10 times with the other leg  Curl up:  Lie on your back with your knees bent and feet flat on the floor  Place your hands, palms down, underneath your lower back   Next, with your elbows on the floor, lift your shoulders and chest 2 to 3 inches off the floor  Keep your head in line with your shoulders  Hold this position  Slowly return to the starting position  Straight leg raises:  Lie on your back with one leg straight  Bend the other knee and place your foot flat on the floor  Tighten your abdominal muscles  Keep your leg straight and slowly lift it straight up 6 to 12 inches off the floor  Hold this position  Lower your leg slowly  Do as many repetitions as directed on this side  Repeat with the other leg  Plank:  Lie on your stomach  Bend your elbows and place your forearms flat on the floor  Lift your chest, stomach, and knees off the floor  Make sure your elbows are below your shoulders  Your body should be in a straight line  Do not let your hips or lower back sink to the ground  Squeeze your abdominal muscles together and hold for 15 seconds  To make this exercise harder, hold for 30 seconds or lift 1 leg at a time  Bicycles:  Lie on your back  Bend both knees and bring them toward your chest  Your calves should be parallel to the floor  Place the palms of your hands on the back of your head  Straighten your right leg and keep it lifted 2 inches off the floor  Raise your head and shoulders off the floor and twist towards your left  Keep your head and shoulders lifted  Bend your right knee while you straighten your left leg  Keep your left leg 2 inches off the floor  Twist your head and chest towards the left leg  Continue to straighten 1 leg at a time and twist        Follow up with your doctor as directed:  Write down your questions so you remember to ask them during your visits  © Copyright [employer] 2022 Information is for End User's use only and may not be sold, redistributed or otherwise used for commercial purposes  All illustrations and images included in CareNotes® are the copyrighted property of [employer]  or Travis Garland   The above information is an  only   It is not intended as medical advice for individual conditions or treatments  Talk to your doctor, nurse or pharmacist before following any medical regimen to see if it is safe and effective for you

## 2022-10-31 ENCOUNTER — HOSPITAL ENCOUNTER (OUTPATIENT)
Dept: MRI IMAGING | Facility: CLINIC | Age: 57
Discharge: HOME | End: 2022-10-31

## 2022-10-31 DIAGNOSIS — M51.14 INTERVERTEBRAL DISC DISORDER WITH RADICULOPATHY OF THORACIC REGION: ICD-10-CM

## 2022-11-04 ENCOUNTER — TELEPHONE (OUTPATIENT)
Dept: RADIOLOGY | Facility: CLINIC | Age: 57
End: 2022-11-04

## 2022-11-04 NOTE — TELEPHONE ENCOUNTER
Caller: patient    Doctor: Vanessa Rosa    Reason for call: returning nurses call    Call back#: [phone] Date: 09/09/17


Patient to discharge today

## 2022-11-04 NOTE — TELEPHONE ENCOUNTER
Please let patient know that I reviewed the MRI thoracic spine and it does show disc herniation towards the left at T10-11 which would account for the rib pain that he experiences    If he is still symptomatic, would recommend proceeding with a thoracic epidural steroid injection

## 2022-11-04 NOTE — TELEPHONE ENCOUNTER
RN informed pt of his thoracic MRI results as per previous task  Pt reports his pain on the at left side does come and go  Some days its sharp and some days it's dull  He is willing to give the steroid injection a try at least once but if doesn't help then maybe therapy or a good chiropractor  He said he prefers the more natural approach  RN did answer pt's questions about how long it will take to start working, how long it will last, and s/e (low risk for infection, elevate blood sugar and blood pressure for a few days following inj and pain )  Told pt he may need to consider a 2nd injection if the first one doesn't provide a lot of relief as the steroid works in an accumulative effect  Pt understood  Pls order inj for pt, pt knows our  will contact him to set up inj  Pt also provided with 's # if no call after 3 business days  Ne Fatima

## 2022-11-09 NOTE — TELEPHONE ENCOUNTER
Patient scheduled for procedure with Murtaza Bird 11/30/22 arrival time 1:10pm   Patient does not have mychart, so the following instructions were given to patient verbally, no vaccines 14 days prior or after procedure, nothing to eat or drink 1 hr prior to procedure, wear something loose and comfortable, no jewelry, if ill, infection or antibiotics, must call office to reschedule procedure  Take prescription medications as normal and you will need a  18 yrs or older  Patient denies taking any bloodthinners

## 2022-11-10 DIAGNOSIS — M51.9 LUMBAR DISC DISEASE: ICD-10-CM

## 2022-11-10 RX ORDER — OXYCODONE HYDROCHLORIDE AND ACETAMINOPHEN 5; 325 MG/1; MG/1
TABLET ORAL
Qty: 120 TABLET | Refills: 0 | OUTPATIENT
Start: 2022-11-10

## 2022-11-15 NOTE — TELEPHONE ENCOUNTER
Pt returned call back -    appt scheduled on 12/05/2022 @ 5 15 PM per provider's request   Pt unable to see Dr Micha deng (works in [facility])  Pt is completely out of meds  PDMP REVIEWED : YES  PAIN MANAGEMENT ON FILE : YES     1 [id number] 10/13/2022 10/05/2022 oxyCODONE HCL-ACETAMINOPHEN (Tablet) 120 0 30 325 MG-5 MG 30 0 [facility]  Private Pay 0 / 0 PA    1 [id number] 09/14/2022 09/08/2022 oxyCODONE HCL-ACETAMINOPHEN (Tablet) 120 0 30 325 MG-5 MG 30 0 [facility]  Private Pay 0 / 0 PA    1 [id number] 08/09/2022 08/03/2022 oxyCODONE HCL-ACETAMINOPHEN (Tablet) 120 0 30 325 MG-5 MG 30 0 [facility]  Private Pay 0 / 0 PA    1 [id number] 07/07/2022 07/05/2022 oxyCODONE HCL-ACETAMINOPHEN (Tablet) 120 0 30 325 MG-5 MG 30 0 [facility]  Private Pay 0 / 0 PA    1 [id number] 06/09/2022 06/09/2022 oxyCODONE HCL-ACETAMINOPHEN (Tablet) 120 0 30 325 MG-5 MG 30 0 [facility]  Private Pay 0 / 0 PA    1 [id number] 05/05/2022 05/05/2022 oxyCODONE HCL-ACETAMINOPHEN (Tablet) 120 0 30 325 MG-5 MG 30 0 [facility]   Private Pay 00 / 0 P        Please approve or refuse refill

## 2022-11-28 ENCOUNTER — TELEPHONE (OUTPATIENT)
Dept: PAIN MEDICINE | Facility: CLINIC | Age: 57
End: 2022-11-28

## 2022-12-11 ENCOUNTER — TELEPHONE (OUTPATIENT)
Dept: OTHER | Facility: OTHER | Age: 57
End: 2022-12-11

## 2022-12-11 NOTE — TELEPHONE ENCOUNTER
PT  Called in stating he needs to cancel his appointment with Dr Santosh Mckeon and needs something as early as 8 or 8:30  Please call him back to reschedule at that time frame

## 2023-01-12 ENCOUNTER — OFFICE VISIT (OUTPATIENT)
Dept: FAMILY MEDICINE CLINIC | Facility: CLINIC | Age: 58
End: 2023-01-12

## 2023-01-12 VITALS
SYSTOLIC BLOOD PRESSURE: 152 MMHG | DIASTOLIC BLOOD PRESSURE: 92 MMHG | BODY MASS INDEX: 28.88 KG/M2 | HEIGHT: 69 IN | TEMPERATURE: 96.3 F | HEART RATE: 70 BPM | WEIGHT: 195 LBS | OXYGEN SATURATION: 98 %

## 2023-01-12 DIAGNOSIS — Z12.5 PROSTATE CANCER SCREENING: ICD-10-CM

## 2023-01-12 DIAGNOSIS — Z72.0 TOBACCO USE: ICD-10-CM

## 2023-01-12 DIAGNOSIS — M51.9 LUMBAR DISC DISEASE: ICD-10-CM

## 2023-01-12 DIAGNOSIS — E78.5 DYSLIPIDEMIA: ICD-10-CM

## 2023-01-12 DIAGNOSIS — M41.24 OTHER IDIOPATHIC SCOLIOSIS, THORACIC REGION: ICD-10-CM

## 2023-01-12 DIAGNOSIS — M51.9 THORACIC DISC DISEASE: Primary | ICD-10-CM

## 2023-01-12 DIAGNOSIS — I10 PRIMARY HYPERTENSION: ICD-10-CM

## 2023-01-12 DIAGNOSIS — F11.20 CONTINUOUS OPIOID DEPENDENCE (HCC): ICD-10-CM

## 2023-01-12 DIAGNOSIS — G89.4 CHRONIC PAIN SYNDROME: ICD-10-CM

## 2023-01-12 PROBLEM — R03.0 ELEVATED BLOOD PRESSURE READING: Status: RESOLVED | Noted: 2021-03-29 | Resolved: 2023-01-12

## 2023-01-12 RX ORDER — LOSARTAN POTASSIUM 50 MG/1
50 TABLET ORAL DAILY
Qty: 30 TABLET | Refills: 5 | Status: SHIPPED | OUTPATIENT
Start: 2023-01-12

## 2023-01-12 RX ORDER — OXYCODONE HYDROCHLORIDE AND ACETAMINOPHEN 5; 325 MG/1; MG/1
TABLET ORAL
Qty: 120 TABLET | Refills: 0 | Status: SHIPPED | OUTPATIENT
Start: 2023-01-12

## 2023-01-12 RX ORDER — NICOTINE 21 MG/24HR
1 PATCH, TRANSDERMAL 24 HOURS TRANSDERMAL EVERY 24 HOURS
Qty: 30 PATCH | Refills: 5 | Status: SHIPPED | OUTPATIENT
Start: 2023-01-12

## 2023-01-12 NOTE — TELEPHONE ENCOUNTER
1 [id number] 10/13/2022 10/05/2022 oxyCODONE HCL-ACETAMINOPHEN (Tablet) 120 0 30 325 MG-5 MG 30 0 [facility]  Private Pay 0 / 0 PA    1 [id number] 09/14/2022 09/08/2022 oxyCODONE HCL-ACETAMINOPHEN (Tablet) 120 0 30 325 MG-5 MG 30 0 [facility]  Private Pay 0 / 0 PA    1 [id number] 08/09/2022 08/03/2022 oxyCODONE HCL-ACETAMINOPHEN (Tablet) 120 0 30 325 MG-5 MG 30 0 [facility]  Private Pay 0 / 0 PA    1 [id number] 07/07/2022 07/05/2022 oxyCODONE HCL-ACETAMINOPHEN (Tablet) 120 0 30 325 MG-5 MG 30 0 [facility]  Private Pay 0 / 0 PA    1 [id number] 06/09/2022 06/09/2022 oxyCODONE HCL-ACETAMINOPHEN (Tablet) 120 0 30 325 MG-5 MG 30 0 [facility]  Private Pay 0 / 0 PA    1 [id number] 05/05/2022 05/05/2022 oxyCODONE HCL-ACETAMINOPHEN (Tablet) 120 0 30 325 MG-5 MG 30 0 [facility]   Private Pay 00 / 0 PA

## 2023-01-12 NOTE — PROGRESS NOTES
Assessment/Plan     Problem List Items Addressed This Visit        Musculoskeletal and Integument    Lumbar disc disease    Thoracic disc disease - Primary    Scoliosis deformity of spine       Other    Tobacco use    Relevant Medications    nicotine (NICODERM CQ) 21 mg/24 hr TD 24 hr patch    losartan (COZAAR) 50 mg tablet    Chronic pain syndrome    Relevant Orders    [facility] All Prescribed Meds and Special Instructions    Amphetamines, Methamphetamines    Butalbital    Phenobarbital    Secobarbital    Temazepam    Alprazolam    Clonazepam    Diazepam    Lorazepam    Oxazepam    Gabapentin    Pregabalin    Cocaine    Heroin    Buprenorphine    Levorphanol    Meperidine    Naltrexone    Fentanyl    Methadone    Oxycodone    Oxymorphone    Tapentadol    THC    Tramadol    Codeine, Hydrocodone, Hydropmorphone, Morphine    Bath Salts    Ethyl Glucuronide/Ethyl Sulfate    Kratom    Spice    Methylphenidate    Phentermine    Validity Oxidant    Validity Creatinine    Validity pH    Validity Specific    Dyslipidemia    Relevant Orders    CBC and differential    Comprehensive metabolic panel    Lipid panel    Continuous opioid dependence (HCC)   Other Visit Diagnoses     Primary hypertension        Relevant Medications    losartan (COZAAR) 50 mg tablet    Prostate cancer screening        Relevant Orders    PSA, Total Screen         Opioid Management Plan      Subjective     Opioid Management HPI  HPI  Pain Medications             oxyCODONE-acetaminophen (PERCOCET) 5-325 mg per tablet 1 qid for pain         Outpatient Morphine Milligram Equivalents Per Day     1/12/23 and after Unknown    Order Name Dose Route Frequency Maximum MME/Day     oxyCODONE-acetaminophen (PERCOCET) 5-325 mg per tablet     Unknown    Total Potential Morphine Milligram Equivalents Per Day Unknown    An error was encountered while attempting to calculate the morphine milligram equivalents per day       Calculation Information oxyCODONE-acetaminophen (PERCOCET) 5-325 mg per tablet    Not enough information to calculate morphine milligram equivalents per day                           PDMP Review       Value Time User    PDMP Reviewed  Yes 10/5/2022  8:54 AM Yumiko Parisi MD         Review of Systems  Objective     /92 (BP Location: Left arm, Patient Position: Sitting, Cuff Size: Adult)   Pulse 70   Temp (!) 96 3 °F (35 7 °C)   Ht 5' 9" (1 753 m)   Wt 88 5 kg (195 lb)   SpO2 98%   BMI 28 80 kg/m²     Physical Exam    Yumiko Parisi MD

## 2023-01-12 NOTE — PATIENT INSTRUCTIONS

## 2023-01-12 NOTE — PROGRESS NOTES
Name: Chelsey Landry      : 1965      MRN: [id number]  Encounter Provider: Jesu Le MD  Encounter Date: 2023   Encounter department: [facility] [address] [address]   Return visit in 4 months  He is to get copy of colonoscopy he had done through his work recently  1  Thoracic disc disease    2  Lumbar disc disease    3  Other idiopathic scoliosis, thoracic region    4  Chronic pain syndrome  -     [facility] All Prescribed Meds and Special Instructions  -     Amphetamines, Methamphetamines  -     Butalbital  -     Phenobarbital  -     Secobarbital  -     Temazepam  -     Alprazolam  -     Clonazepam  -     Diazepam  -     Lorazepam  -     Oxazepam  -     Gabapentin  -     Pregabalin  -     Cocaine  -     Heroin  -     Buprenorphine  -     Levorphanol  -     Meperidine  -     Naltrexone  -     Fentanyl  -     Methadone  -     Oxycodone  -     Oxymorphone  -     Tapentadol  -     THC  -     Tramadol  -     Codeine, Hydrocodone, Hydropmorphone, Morphine  -     Bath Salts  -     Ethyl Glucuronide/Ethyl Sulfate  -     Kratom  -     Spice  -     Methylphenidate  -     Phentermine  -     Validity Oxidant  -     Validity Creatinine  -     Validity pH  -     Validity Specific    5  Continuous opioid dependence ([address] )    6  Primary hypertension    7  Tobacco use  -     nicotine (NICODERM CQ) 21 mg/24 hr TD 24 hr patch; Place 1 patch on the skin every 24 hours  -     losartan (COZAAR) 50 mg tablet; Take 1 tablet (50 mg total) by mouth daily    8  Prostate cancer screening  -     PSA, Total Screen; Future    9  Dyslipidemia  -     CBC and differential; Future  -     Comprehensive metabolic panel; Future  -     Lipid panel; Future      BMI Counseling: Body mass index is 28 8 kg/m²  The BMI is above normal  Nutrition recommendations include decreasing portion sizes and moderation in carbohydrate intake   Exercise recommendations include exercising 3-5 times per week  No pharmacotherapy was ordered  Rationale for BMI follow-up plan is due to patient being overweight or obese  Depression Screening and Follow-up Plan: Patient was screened for depression during today's encounter  They screened negative with a PHQ-2 score of 0  Subjective      Patient comes in for checkup  He takes Percocet 5 mg 4 times daily for chronic left-sided mid back pain  He has known lumbar and thoracic disc disease  He is seeing pain management and epidural injections planned  He is well aware of problems associated with narcotic pain medication including overdose and addiction  He has not shown any problems with this  He did run out of his pain medication over 1 week ago because he has not had an appointment in quite some time  He also is having blood pressure readings in the high 140s his blood pressures taken at work every day  He also wishes to quit smoking  He has current opioid agreement on file and we are doing urine drug testing today  Review of Systems   Constitutional: Negative  Respiratory: Negative  Musculoskeletal: Positive for back pain         Current Outpatient Medications on File Prior to Visit   Medication Sig   • b complex vitamins tablet Take 1 tablet by mouth daily   • Black Currant Seed Oil 500 MG CAPS Take 1 capsule by mouth daily   • Cholecalciferol (Vitamin D3) 125 MCG (5000 UT) TABS Take 5,000 Units by mouth daily   • Multiple Vitamin (multivitamin) tablet Take 1 tablet by mouth daily   • oxyCODONE-acetaminophen (PERCOCET) 5-325 mg per tablet 1 qid for pain   • saccharomyces boulardii (FLORASTOR) 250 mg capsule Take 250 mg by mouth 2 (two) times a day   • Turmeric 500 MG CAPS Take by mouth   • Diclofenac Sodium (VOLTAREN) 1 % Apply 2 g topically 4 (four) times a day       Objective     /92 (BP Location: Left arm, Patient Position: Sitting, Cuff Size: Adult)   Pulse 70   Temp (!) 96 3 °F (35 7 °C)   Ht 5' 9" (1 753 m)   Wt 88 5 kg (195 lb) SpO2 98%   BMI 28 80 kg/m²     Physical Exam  Constitutional:       Appearance: Normal appearance  He is well-developed  HENT:      Head: Normocephalic and atraumatic  Eyes:      Pupils: Pupils are equal, round, and reactive to light  Cardiovascular:      Rate and Rhythm: Normal rate and regular rhythm  Heart sounds: Normal heart sounds  Pulmonary:      Effort: Pulmonary effort is normal       Breath sounds: Normal breath sounds  Musculoskeletal:      Cervical back: Neck supple  Comments: Tender left-sided lumbar and thoracic paraspinal muscles  Skin:     General: Skin is warm and dry  Neurological:      Mental Status: He is alert  Psychiatric:         Mood and Affect: Mood normal          Behavior: Behavior normal          Thought Content:  Thought content normal        Yifan Everett MD

## 2023-01-15 LAB

## 2023-02-07 DIAGNOSIS — Z72.0 TOBACCO USE: ICD-10-CM

## 2023-02-07 RX ORDER — LOSARTAN POTASSIUM 50 MG/1
TABLET ORAL
Qty: 90 TABLET | Refills: 2 | Status: SHIPPED | OUTPATIENT
Start: 2023-02-07

## 2023-02-09 DIAGNOSIS — M51.9 LUMBAR DISC DISEASE: ICD-10-CM

## 2023-02-09 RX ORDER — OXYCODONE HYDROCHLORIDE AND ACETAMINOPHEN 5; 325 MG/1; MG/1
TABLET ORAL
Qty: 120 TABLET | Refills: 0 | Status: SHIPPED | OUTPATIENT
Start: 2023-02-09

## 2023-03-17 DIAGNOSIS — M51.9 LUMBAR DISC DISEASE: ICD-10-CM

## 2023-03-17 RX ORDER — OXYCODONE HYDROCHLORIDE AND ACETAMINOPHEN 5; 325 MG/1; MG/1
TABLET ORAL
Qty: 120 TABLET | Refills: 0 | Status: SHIPPED | OUTPATIENT
Start: 2023-03-17

## 2023-03-17 NOTE — TELEPHONE ENCOUNTER
nicotine (NICODERM CQ) 21 mg/24 hr TD 24 hr patch pt asking for step 2 of patches and attached refill

## 2023-03-17 NOTE — TELEPHONE ENCOUNTER
Medication:  [facility]   [id number] 02/12/2023 02/09/2023 ACETAMINOPHEN 325 MG / oxyCODONE HYDROCHLORIDE 5 MG ORAL TABLET (Tablet)  120 0 30 5 0 MG/325 0 MG 30 0 [facility], [facility]  Private Pay 0 / 0 PA     1  [id number] 01/12/2023 01/12/2023 ACETAMINOPHEN 325 MG / oxyCODONE HYDROCHLORIDE 5 MG ORAL TABLET (Tablet)  120 0 30 5 0 MG/325 0 MG 30 0 [facility], [facility]  Private Pay 0 / 0 PA    1  [id number] 10/13/2022  10/05/2022 ACETAMINOPHEN 325 MG / oxyCODONE HYDROCHLORIDE 5 MG ORAL TABLET (Tablet)  120 0 30 5 0 MG/325 0 MG 30 0 [facility], [facility]    Private Pay 0 / 0 PA      Active agreement on file -Yes    Please review in the absence of Dr Skyler Iyer

## 2023-05-17 DIAGNOSIS — M51.9 LUMBAR DISC DISEASE: ICD-10-CM

## 2023-05-18 NOTE — TELEPHONE ENCOUNTER
T/c from pt -- checking on status of refill   Pt asking if possibly another provider can send in for him in absence of Dr Emily Delacruz    Please advise

## 2023-05-19 ENCOUNTER — TELEPHONE (OUTPATIENT)
Dept: FAMILY MEDICINE CLINIC | Facility: CLINIC | Age: 58
End: 2023-05-19

## 2023-05-19 RX ORDER — OXYCODONE HYDROCHLORIDE AND ACETAMINOPHEN 5; 325 MG/1; MG/1
TABLET ORAL
Qty: 120 TABLET | Refills: 0 | Status: SHIPPED | OUTPATIENT
Start: 2023-05-19

## 2023-05-25 ENCOUNTER — TELEPHONE (OUTPATIENT)
Dept: FAMILY MEDICINE CLINIC | Facility: CLINIC | Age: 58
End: 2023-05-25

## 2023-05-25 NOTE — TELEPHONE ENCOUNTER
Fax received from [facility]  Skype  notifying us that oxyCODONE-acetaminophen (PERCOCET) 5-325 mg per tablet  is Backordered / Med March  Script sent 5/19/2023      Pharmacy is requesting an alternative and DD needs approval from MD       Please review Ahsan Callahan

## 2023-06-09 DIAGNOSIS — M51.9 LUMBAR DISC DISEASE: ICD-10-CM

## 2023-06-10 RX ORDER — OXYCODONE HYDROCHLORIDE AND ACETAMINOPHEN 5; 325 MG/1; MG/1
TABLET ORAL
Qty: 120 TABLET | Refills: 0 | OUTPATIENT
Start: 2023-06-10

## 2023-06-15 NOTE — TELEPHONE ENCOUNTER
Pt scheduled appt in first available 8:20 opening on 7/18 -- is requesting another provider send in courtesy refill in Dr Phelps Bent absence

## 2023-06-16 NOTE — TELEPHONE ENCOUNTER
Pt called to check on status of refill  Requesting courtesy refill from a provider, in Dr Michelle Messer' absence

## 2023-06-19 RX ORDER — OXYCODONE HYDROCHLORIDE AND ACETAMINOPHEN 5; 325 MG/1; MG/1
TABLET ORAL
Qty: 120 TABLET | Refills: 0 | Status: SHIPPED | OUTPATIENT
Start: 2023-06-19 | End: 2023-06-22 | Stop reason: SDUPTHER

## 2023-06-21 RX ORDER — OXYCODONE HYDROCHLORIDE AND ACETAMINOPHEN 5; 325 MG/1; MG/1
TABLET ORAL
Qty: 120 TABLET | Refills: 0 | OUTPATIENT
Start: 2023-06-21

## 2023-06-21 NOTE — TELEPHONE ENCOUNTER
T/c from pt -- pharmacy informed him this medication is on back order and they do not know when they will get it in  Is requesting the script be resent to [facility] so he can have it filled

## 2023-06-21 NOTE — TELEPHONE ENCOUNTER
T/c from pt - pt requesting script be sent to [facility] has been changed in this t/c

## 2023-06-22 RX ORDER — OXYCODONE HYDROCHLORIDE AND ACETAMINOPHEN 5; 325 MG/1; MG/1
TABLET ORAL
Qty: 120 TABLET | Refills: 0 | Status: SHIPPED | OUTPATIENT
Start: 2023-06-22

## 2023-06-22 NOTE — TELEPHONE ENCOUNTER
Called to inform pt refill was sent, pt states [facility] is out of the medication and is requesting Dr Alex Isaac to send medication to [facility] instead  Asking if this is marcin to be done as soon as possible       Please advise

## 2023-06-23 ENCOUNTER — TELEPHONE (OUTPATIENT)
Dept: FAMILY MEDICINE CLINIC | Facility: CLINIC | Age: 58
End: 2023-06-23

## 2023-06-23 DIAGNOSIS — M51.9 LUMBAR DISC DISEASE: ICD-10-CM

## 2023-06-23 NOTE — TELEPHONE ENCOUNTER
T/c from pt -- [facility] only had 88 tablets available when he went to  his oxycodone script  Pt needs Dr Sirena Conn to send over new script for the remaining 32  Is aware Dr Sirena Conn is out of the office until Monday

## 2023-06-26 RX ORDER — OXYCODONE HYDROCHLORIDE AND ACETAMINOPHEN 5; 325 MG/1; MG/1
TABLET ORAL
Qty: 120 TABLET | Refills: 0 | Status: SHIPPED | OUTPATIENT
Start: 2023-06-26 | End: 2023-06-27 | Stop reason: SDUPTHER

## 2023-06-26 NOTE — TELEPHONE ENCOUNTER
Spoke w pt - notified - quantity of 12 was sent in  Pt aware       [address] [id number] [place] [facility] [address] [facility]    E-Prescribing Status: Receipt confirmed by pharmacy (6/26/2023 12:51 PM EDT)

## 2023-06-27 RX ORDER — OXYCODONE HYDROCHLORIDE AND ACETAMINOPHEN 5; 325 MG/1; MG/1
TABLET ORAL
Qty: 120 TABLET | Refills: 0 | Status: SHIPPED | OUTPATIENT
Start: 2023-06-27

## 2023-06-27 NOTE — TELEPHONE ENCOUNTER
T/c from pt -- states pharmacy did not receive the script for the remaining 32 tablets of his Oxycodone  Pt asking if Dr Indigo Donato can resend the medication       Please advise

## 2023-06-28 NOTE — TELEPHONE ENCOUNTER
Spoke to pt  States pharmacy will not fill it until July 12th  Informed pt he should have enough to hold him over  He was given a 22 day supply

## 2023-07-11 DIAGNOSIS — M51.9 LUMBAR DISC DISEASE: ICD-10-CM

## 2023-07-11 RX ORDER — OXYCODONE HYDROCHLORIDE AND ACETAMINOPHEN 5; 325 MG/1; MG/1
TABLET ORAL
Qty: 120 TABLET | Refills: 0 | Status: SHIPPED | OUTPATIENT
Start: 2023-07-11

## 2023-07-11 NOTE — TELEPHONE ENCOUNTER
[id number] 06/23/2023 06/22/2023 ACETAMINOPHEN 325 MG / oxyCODONE HYDROCHLORIDE 5 MG ORAL TABLET (Tablet) 88.0 22 5.0 MG/325.0 [facility] 30.0 [facility] / 0 PA     2 [id number] 05/19/2023 05/19/2023 ACETAMINOPHEN 325 MG / oxyCODONE HYDROCHLORIDE 5 MG ORAL TABLET (Tablet) 120.0 30 5.0 MG/325.0 MG 30.0 [facility] Private Pay 0 / 0 PA    2 [id number] 04/21/2023 04/16/2023 ACETAMINOPHEN 325 MG / oxyCODONE HYDROCHLORIDE 5 MG ORAL TABLET (Tablet) 120.0 30 5.0 MG/325.0 MG 30.0 [facility] Private Pay 0 / 0 PA    2 [id number] 03/17/2023 03/17/2023 ACETAMINOPHEN 325 MG / oxyCODONE HYDROCHLORIDE 5 MG ORAL TABLET (Tablet) 120.0 30 5.0 MG/325.0 MG 30.0 [facility] Private Pay 0 / 0 PA    2 [id number] 02/12/2023 02/09/2023 ACETAMINOPHEN 325 MG / oxyCODONE HYDROCHLORIDE 5 MG ORAL TABLET (Tablet) 120.0 30 5.0 MG/325.0 MG 30.0 [facility] Private Pay 0 / 0 PA    2 [id number] 01/12/2023 01/12/2023 ACETAMINOPHEN 325 MG / oxyCODONE HYDROCHLORIDE 5 MG ORAL TABLET (Tablet) 120.0 30 5.0 MG/325.0 MG 30.0 [facility]  Private Pay 0 / 0 PA

## 2023-07-12 ENCOUNTER — TELEPHONE (OUTPATIENT)
Dept: FAMILY MEDICINE CLINIC | Facility: CLINIC | Age: 58
End: 2023-07-12

## 2023-07-12 DIAGNOSIS — Z72.0 TOBACCO USE: Primary | ICD-10-CM

## 2023-07-12 NOTE — TELEPHONE ENCOUNTER
Returned message to pt - left detailed vm for pt to contact his pharmacy for /delivery.      E-Prescribing Status: Receipt confirmed by pharmacy (7/11/2023  2:55 PM EDT)      "Hi, my name is Fadi Delaney calling about my refill prescription for Doctor Rosaura Wolff of the oxycodone to [facility] or on [phone]. and my number my date of birth [birth year] and my phone number is [phone] Thank you."

## 2023-07-13 RX ORDER — NICOTINE 21 MG/24HR
1 PATCH, TRANSDERMAL 24 HOURS TRANSDERMAL EVERY 24 HOURS
Qty: 28 PATCH | Refills: 5 | Status: SHIPPED | OUTPATIENT
Start: 2023-07-13

## 2023-08-09 DIAGNOSIS — M51.9 LUMBAR DISC DISEASE: ICD-10-CM

## 2023-08-09 RX ORDER — OXYCODONE HYDROCHLORIDE AND ACETAMINOPHEN 5; 325 MG/1; MG/1
TABLET ORAL
Qty: 120 TABLET | Refills: 0 | Status: SHIPPED | OUTPATIENT
Start: 2023-08-09

## 2023-08-09 NOTE — TELEPHONE ENCOUNTER
Patient called in for a refill of his medication oxyCODONE-acetaminophen (PERCOCET) 5-325 mg.                     1 [id number] 07/11/2023 07/11/2023 ACETAMINOPHEN 325 MG / oxyCODONE HYDROCHLORIDE 5 MG ORAL TABLET (Tablet) 120.0 30 5.0 MG/325.0 MG 30.0 [facility] 0 / 0 PA    1 [id number] 06/23/2023 06/22/2023 ACETAMINOPHEN 325 MG / oxyCODONE HYDROCHLORIDE 5 MG ORAL TABLET (Tablet) 88.0 22 5.0 MG/325.0 MG 30.0 [facility] Commercial [facility] 0 / 0 PA    2 [id number] 05/19/2023 05/19/2023 ACETAMINOPHEN 325 MG / oxyCODONE HYDROCHLORIDE 5 MG ORAL TABLET (Tablet) 120.0 30 5.0 MG/325.0 MG 30.0 [facility] Private Pay 0 / 0 PA    2 [id number] 04/21/2023 04/16/2023 ACETAMINOPHEN 325 MG / oxyCODONE HYDROCHLORIDE 5 MG ORAL TABLET (Tablet) 120.0 30 5.0 MG/325.0 MG 30.0 [facility] Private Pay 0 / 0 PA    2 [id number] 03/17/2023 03/17/2023 ACETAMINOPHEN 325 MG / oxyCODONE HYDROCHLORIDE 5 MG ORAL TABLET (Tablet) 120.0 30 5.0 MG/325.0 MG 30.0 [facility] Private Pay 0 / 0 PA    2 [id number] 02/12/2023 02/09/2023 ACETAMINOPHEN 325 MG / oxyCODONE HYDROCHLORIDE 5 MG ORAL TABLET (Tablet) 120.0 30 5.0 MG/325.0 MG 30.0 [facility] Private Pay 0 / 0 PA    2 [id number] 01/12/2023 01/12/2023 ACETAMINOPHEN 325 MG / oxyCODONE HYDROCHLORIDE 5 MG ORAL TABLET (Tablet) 120.0 30 5.0 MG/325.0 MG 30.0 [facility] ACTIVE PAIN MANAGEMENT ON FILE : YES   PDMP REVIEWED VIA WEB SITE : YES       Please approve or refuse refill.

## 2023-08-10 NOTE — TELEPHONE ENCOUNTER
T/c from pt - req to r/s PE from 8/14 (unabe to made it on that day) - r/s from 08/16/2023 @ 9:00 AM.     Also following up on his earlier req regards meds refill - pt notified.        E-Prescribing Status: Receipt confirmed by pharmacy (8/9/2023 10:02 AM EDT)

## 2023-08-16 ENCOUNTER — OFFICE VISIT (OUTPATIENT)
Dept: FAMILY MEDICINE CLINIC | Facility: CLINIC | Age: 58
End: 2023-08-16
Payer: COMMERCIAL

## 2023-08-16 VITALS
WEIGHT: 202 LBS | TEMPERATURE: 97.6 F | HEIGHT: 69 IN | OXYGEN SATURATION: 98 % | DIASTOLIC BLOOD PRESSURE: 78 MMHG | HEART RATE: 71 BPM | BODY MASS INDEX: 29.92 KG/M2 | SYSTOLIC BLOOD PRESSURE: 132 MMHG

## 2023-08-16 DIAGNOSIS — Z72.0 TOBACCO USE: ICD-10-CM

## 2023-08-16 DIAGNOSIS — Z00.00 HEALTH MAINTENANCE EXAMINATION: Primary | ICD-10-CM

## 2023-08-16 DIAGNOSIS — F11.20 CONTINUOUS OPIOID DEPENDENCE (HCC): ICD-10-CM

## 2023-08-16 DIAGNOSIS — Z12.5 PROSTATE CANCER SCREENING: ICD-10-CM

## 2023-08-16 DIAGNOSIS — G89.4 CHRONIC PAIN SYNDROME: ICD-10-CM

## 2023-08-16 DIAGNOSIS — M51.9 THORACIC DISC DISEASE: ICD-10-CM

## 2023-08-16 DIAGNOSIS — M51.9 LUMBAR DISC DISEASE: ICD-10-CM

## 2023-08-16 DIAGNOSIS — M41.24 OTHER IDIOPATHIC SCOLIOSIS, THORACIC REGION: ICD-10-CM

## 2023-08-16 DIAGNOSIS — F41.9 ANXIETY: ICD-10-CM

## 2023-08-16 DIAGNOSIS — E78.5 DYSLIPIDEMIA: ICD-10-CM

## 2023-08-16 PROCEDURE — 99396 PREV VISIT EST AGE 40-64: CPT | Performed by: FAMILY MEDICINE

## 2023-08-16 NOTE — PROGRESS NOTES
Name: Ivis Garnica      : 1965      MRN: [id number]  Encounter Provider: Bishop Terra MD  Encounter Date: 2023   Encounter department: [address] [place] [address] [address] [facility]   Return visit in 4 months. 1. Health maintenance examination    2. Lumbar disc disease    3. Thoracic disc disease    4. Chronic pain syndrome    5. Continuous opioid dependence ([address])    6. Dyslipidemia  -     CBC and differential; Future  -     Comprehensive metabolic panel; Future  -     Lipid panel; Future    7. Tobacco use    8. Anxiety  Assessment & Plan:  Patient requested benzodiazepines which I refused. He refuses hydroxyzine. 9. Prostate cancer screening  -     PSA, Total Screen; Future    10. Other idiopathic scoliosis, thoracic region      Depression Screening and Follow-up Plan: Patient was screened for depression during today's encounter. They screened negative with a PHQ-2 score of 0. Subjective      Patient comes in for checkup. He takes Percocet 5 mg 4 times daily for chronic mid and low back pain. He feels this keeps him functional and able to work. He has opioid agreement on file and drug testing last visit. He is quitting smoking using nicotine patch. He complains of anxiety she has had problems with in the past.    Review of Systems   Constitutional: Negative. Respiratory: Negative. Cardiovascular: Negative. Musculoskeletal: Positive for back pain. Psychiatric/Behavioral: Negative for dysphoric mood. The patient is nervous/anxious.         Current Outpatient Medications on File Prior to Visit   Medication Sig   • b complex vitamins tablet Take 1 tablet by mouth daily   • Black Currant Seed Oil 500 MG CAPS Take 1 capsule by mouth daily   • Cholecalciferol (Vitamin D3) 125 MCG (5000 UT) TABS Take 5,000 Units by mouth daily   • Multiple Vitamin (multivitamin) tablet Take 1 tablet by mouth daily   • nicotine (NICODERM CQ) 14 mg/24hr TD 24 hr patch Place 1 patch on the skin over 24 hours every 24 hours   • oxyCODONE-acetaminophen (PERCOCET) 5-325 mg per tablet 1 qid for pain   • saccharomyces boulardii (FLORASTOR) 250 mg capsule Take 250 mg by mouth 2 (two) times a day   • Turmeric 500 MG CAPS Take by mouth   • [DISCONTINUED] losartan (COZAAR) 50 mg tablet TAKE 1 TABLET BY MOUTH EVERY DAY   • Diclofenac Sodium (VOLTAREN) 1 % Apply 2 g topically 4 (four) times a day       Objective     /78 (BP Location: Left arm, Patient Position: Sitting, Cuff Size: Adult)   Pulse 71   Temp 97.6 °F (36.4 °C) (Tympanic)   Ht 5' 9" (1.753 m)   Wt 91.6 kg (202 lb)   SpO2 98%   BMI 29.83 kg/m²     Physical Exam  Constitutional:       Appearance: Normal appearance. He is well-developed. HENT:      Head: Normocephalic and atraumatic. Cardiovascular:      Rate and Rhythm: Normal rate and regular rhythm. Heart sounds: Normal heart sounds. Pulmonary:      Effort: Pulmonary effort is normal.      Breath sounds: Normal breath sounds. Abdominal:      General: Bowel sounds are normal.      Palpations: Abdomen is soft. Musculoskeletal:      Cervical back: Neck supple. Comments: Noticeable scoliosis deformity of mid and lower back. Tender paraspinal muscles. Skin:     General: Skin is warm and dry. Neurological:      Mental Status: He is alert.    Psychiatric:         Mood and Affect: Mood normal.         Behavior: Behavior normal.       Gabbie Whyte MD

## 2023-09-08 ENCOUNTER — NURSE TRIAGE (OUTPATIENT)
Dept: OTHER | Facility: OTHER | Age: 58
End: 2023-09-08

## 2023-09-08 DIAGNOSIS — M51.9 LUMBAR DISC DISEASE: ICD-10-CM

## 2023-09-08 RX ORDER — OXYCODONE HYDROCHLORIDE AND ACETAMINOPHEN 5; 325 MG/1; MG/1
TABLET ORAL
Qty: 120 TABLET | Refills: 0 | Status: SHIPPED | OUTPATIENT
Start: 2023-09-08 | End: 2023-09-11 | Stop reason: SDUPTHER

## 2023-09-08 NOTE — TELEPHONE ENCOUNTER
T/c from pt -- med was sent to [place], but they do not have the med -- pt requesting it be resent to [facility] on [address], where he was told the medication is available

## 2023-09-09 NOTE — TELEPHONE ENCOUNTER
Regarding: Prescription not at pharmacy  ----- Message from Kendall Rain sent at 9/8/2023  7:05 PM EDT -----  "My dr sent my oxycodone prescription in but  it never made it to the pharmacy. "

## 2023-09-09 NOTE — TELEPHONE ENCOUNTER
Reason for Disposition  • Caller requesting a CONTROLLED substance prescription refill (e.g., narcotics, ADHD medicines)    Protocols used: MEDICATION REFILL AND RENEWAL CALL-ADULT-

## 2023-09-11 DIAGNOSIS — M51.9 LUMBAR DISC DISEASE: ICD-10-CM

## 2023-09-11 RX ORDER — OXYCODONE HYDROCHLORIDE AND ACETAMINOPHEN 5; 325 MG/1; MG/1
TABLET ORAL
Qty: 120 TABLET | Refills: 0 | Status: SHIPPED | OUTPATIENT
Start: 2023-09-11

## 2023-09-11 NOTE — TELEPHONE ENCOUNTER
T/c from pt -- states that last week Renae had a power outage and he was unable to fill his script. Pt asking if Dr Mere Dugan can send it to the [facility] on [address]. 1 [id number] 08/09/2023 08/09/2023 ACETAMINOPHEN 325 MG / oxyCODONE HYDROCHLORIDE 5 MG ORAL TABLET (Tablet) 120.0 30 5.0 MG/325.0 MG 30.0 [employer] Commercial Insurance 0 / 0 PA    1 [id number] 07/11/2023 07/11/2023 ACETAMINOPHEN 325 MG / oxyCODONE HYDROCHLORIDE 5 MG ORAL TABLET (Tablet) 120.0 30 5.0 MG/325.0 MG 30.0 [employer] Commercial Insurance 0 / 0 PA    1 [id number] 06/23/2023 06/22/2023 ACETAMINOPHEN 325 MG / oxyCODONE HYDROCHLORIDE 5 MG ORAL TABLET (Tablet) 88.0 22 5.0 MG/325.0 MG 30.0 [employer] [facility] Commercial Insurance 0 / 0 PA    2 [id number] 05/19/2023 05/19/2023 ACETAMINOPHEN 325 MG / oxyCODONE HYDROCHLORIDE 5 MG ORAL TABLET (Tablet) 120.0 30 5.0 MG/325.0 MG 30.0 GOLDIE GARCIA [facility] Private Pay 0 / 0 PA    2 [id number] 04/21/2023 04/16/2023 ACETAMINOPHEN 325 MG / oxyCODONE HYDROCHLORIDE 5 MG ORAL TABLET (Tablet) 120.0 30 5.0 MG/325.0 MG 30.0 [facility]  Private Pay 0 / 0 PA       Please advise

## 2023-10-06 ENCOUNTER — TELEPHONE (OUTPATIENT)
Dept: FAMILY MEDICINE CLINIC | Facility: CLINIC | Age: 58
End: 2023-10-06

## 2023-10-06 NOTE — TELEPHONE ENCOUNTER
T/c from pt -- states his back is hurting and is unable to go to work today. Pt is asking if Dr Jailyn Sullivan can give him a work note for today. Pt is aware that Dr Jailyn Sullivan is out of the office and may not respond today.      Please advise

## 2023-10-09 DIAGNOSIS — M51.9 LUMBAR DISC DISEASE: ICD-10-CM

## 2023-10-09 RX ORDER — OXYCODONE HYDROCHLORIDE AND ACETAMINOPHEN 5; 325 MG/1; MG/1
TABLET ORAL
Qty: 120 TABLET | Refills: 0 | Status: SHIPPED | OUTPATIENT
Start: 2023-10-09

## 2023-10-09 NOTE — TELEPHONE ENCOUNTER
1 [id number] 10/05/2023 10/05/2023 diazePAM (Tablet) 120.0 30 5 MG NA BIANKA [facility] Medicaid 0 / 0 PA    1 [id number] 09/13/2023 08/24/2023 HYDROmorphone HCL (Tablet) 120.0 30 4 MG 64.0 [facility] Medicaid 0 / 0 PA    1 [id number] 09/05/2023 08/24/2023 diazePAM (Tablet) 120.0 30 5 MG NA [facility]  Medicaid 0 / 0 PA    1 [id number] 08/22/2023 08/22/2023 diazePAM (Tablet) 60.0 15 5 MG  [facility] Medicaid 0 / 0 PA    1 [id number] 08/14/2023 07/27/2023 HYDROmorphone HCL (Tablet) 40.0 10 4 MG 64.0 865 [facility] Private Pay 0 / 0 PA    1 [id number] 07/30/2023 07/27/2023 diazePAM (Tablet) 100.0 25 5 MG  [facility] Medic

## 2023-11-06 DIAGNOSIS — M51.9 LUMBAR DISC DISEASE: ICD-10-CM

## 2023-11-06 RX ORDER — OXYCODONE HYDROCHLORIDE AND ACETAMINOPHEN 5; 325 MG/1; MG/1
TABLET ORAL
Qty: 120 TABLET | Refills: 0 | Status: SHIPPED | OUTPATIENT
Start: 2023-11-06

## 2023-11-06 NOTE — TELEPHONE ENCOUNTER
1 [id number] 10/09/2023 10/09/2023 ACETAMINOPHEN 325 MG / oxyCODONE HYDROCHLORIDE 5 MG ORAL TABLET (Tablet) 120.0 30 5.0 MG/325.0 MG 30.0 [facility] Private Pay 0 / 0 PA    1 [id number] 09/11/2023 09/11/2023 ACETAMINOPHEN 325 MG / oxyCODONE HYDROCHLORIDE 5 MG ORAL TABLET (Tablet) 120.0 30 5.0 MG/325.0 MG 30.0 [facility]  Private Pay 0 / 0 PA    2 [id number] 08/09/2023 08/09/2023 ACETAMINOPHEN 325 MG / oxyCODONE HYDROCHLORIDE 5 MG ORAL TABLET (Tablet) 120.0 30 5.0 MG/325.0 MG 30.0 [employer] Commercial Insurance 0 / 0 PA    2 [id number] 07/11/2023 07/11/2023 ACETAMINOPHEN 325 MG / oxyCODONE HYDROCHLORIDE 5 MG ORAL TABLET (Tablet) 120.0 30 5.0 MG/325.0 MG 30.0 [employer] Commercial Insurance 0 / 0 PA    2 [id number] 06/23/2023 06/22/2023 ACETAMINOPHEN 325 MG / oxyCODONE HYDROCHLORIDE 5 MG ORAL TABLET (Tablet) 88.0 22 5.0 MG/325.0 MG 30.0 [employer] [facility]

## 2023-11-29 ENCOUNTER — TELEPHONE (OUTPATIENT)
Dept: FAMILY MEDICINE CLINIC | Facility: CLINIC | Age: 58
End: 2023-11-29

## 2023-11-29 DIAGNOSIS — N52.9 ERECTILE DYSFUNCTION, UNSPECIFIED ERECTILE DYSFUNCTION TYPE: Primary | ICD-10-CM

## 2023-11-29 RX ORDER — TADALAFIL 5 MG/1
5 TABLET ORAL DAILY PRN
Qty: 10 TABLET | Refills: 0 | Status: SHIPPED | OUTPATIENT
Start: 2023-11-29

## 2023-11-29 NOTE — TELEPHONE ENCOUNTER
T/c from pt - asking if he needs to schedule an appt with you to get a prescription for Cialis or can this be sent in. Please advise.

## 2023-12-05 DIAGNOSIS — M51.9 LUMBAR DISC DISEASE: ICD-10-CM

## 2023-12-05 RX ORDER — OXYCODONE HYDROCHLORIDE AND ACETAMINOPHEN 5; 325 MG/1; MG/1
TABLET ORAL
Qty: 120 TABLET | Refills: 0 | Status: SHIPPED | OUTPATIENT
Start: 2023-12-05

## 2023-12-05 NOTE — TELEPHONE ENCOUNTER
1 [id number] 11/06/2023 11/06/2023 ACETAMINOPHEN 325 MG / oxyCODONE HYDROCHLORIDE 5 MG ORAL TABLET (Tablet) 120.0 30 5.0 MG/325.0 MG 30.0 [facility] Private Pay 0 / 0 PA    1 [id number] 10/09/2023 10/09/2023 ACETAMINOPHEN 325 MG / oxyCODONE HYDROCHLORIDE 5 MG ORAL TABLET (Tablet) 120.0 30 5.0 MG/325.0 MG 30.0 [facility], [facility] Private Pay 0 / 0 PA    1 [id number] 09/11/2023 09/11/2023 ACETAMINOPHEN 325 MG / oxyCODONE HYDROCHLORIDE 5 MG ORAL TABLET (Tablet) 120.0 30 5.0 MG/325.0 MG 30.0 [facility] Private Pay 0 / 0 PA    2 [id number] 08/09/2023 08/09/2023 ACETAMINOPHEN 325 MG / oxyCODONE HYDROCHLORIDE 5 MG ORAL TABLET (Tablet) 120.0 30 5.0 MG/325.0 MG 30.0 [employer] Commercial Insurance 0 / 0 PA    2 [id number] 07/11/2023 07/11/2023 ACETAMINOPHEN 325 MG / oxyCODONE HYDROCHLORIDE 5 MG ORAL TABLET (Tablet) 120.0 30 5.0 MG/325.0 MG 30.0 [employer]ANK[employer] Commercial Insurance 0 / 0 PA    2 [id number] 06/23/2023 06/22/2023 ACETAMINOPHEN 325 MG / oxyCODONE HYDROCHLORIDE 5 MG ORAL TABLET (Tablet) 88.0 22 5.0 MG/325.0 MG 30.0 BIAKNA [employer] Commercial Insurance 0 / 0 PA    1 [id number] 05/19/2023 05/19/2023 ACETAMINOPHEN 325 MG / oxyCODONE HYDROCHLORIDE 5 MG ORAL TABLET (Tablet) 120.0 30 5.0 MG/325.0 MG 30.0 [facility]  Private P

## 2024-01-03 DIAGNOSIS — M51.9 LUMBAR DISC DISEASE: ICD-10-CM

## 2024-01-03 RX ORDER — OXYCODONE HYDROCHLORIDE AND ACETAMINOPHEN 5; 325 MG/1; MG/1
TABLET ORAL
Qty: 120 TABLET | Refills: 0 | Status: SHIPPED | OUTPATIENT
Start: 2024-01-03

## 2024-01-03 NOTE — TELEPHONE ENCOUNTER
Pt rescheduled missed appt -- coming in on 1/23/24    1 [id number] 12/05/2023 12/05/2023 ACETAMINOPHEN 325 MG / oxyCODONE HYDROCHLORIDE 5 MG ORAL TABLET (Tablet) 120.0 30 5.0 MG/325.0 MG 30.0 [facility] Private Pay 0 / 0 PA    1 [id number] 11/06/2023 11/06/2023 ACETAMINOPHEN 325 MG / oxyCODONE HYDROCHLORIDE 5 MG ORAL TABLET (Tablet) 120.0 30 5.0 MG/325.0 MG 30.0 [facility] Private Pay 0 / 0 PA    1 [id number] 10/09/2023 10/09/2023 ACETAMINOPHEN 325 MG / oxyCODONE HYDROCHLORIDE 5 MG ORAL TABLET (Tablet) 120.0 30 5.0 MG/325.0 MG 30.0 [facility]. Private Pay 0 / 0 PA    1 [id number] 09/11/2023 09/11/2023 ACETAMINOPHEN 325 MG / oxyCODONE HYDROCHLORIDE 5 MG ORAL TABLET (Tablet) 120.0 30 5.0 MG/325.0 MG 30.0 [facility] Private Pay 0 / 0 PA    2 [id number] 08/09/2023 08/09/2023 ACETAMINOPHEN 325 MG / oxyCODONE HYDROCHLORIDE 5 MG ORAL TABLET (Tablet) 120.0 30 5.0 MG/325.0 MG 30.0 [employer] Commercial Insurance 0 / 0 PA    2 [id number] 07/11/2023 07/11/2023 ACETAMINOPHEN 325 MG / oxyCODONE HYDROCHLORIDE 5 MG ORAL TABLET (Tablet) 120.0 30 5.0 MG/325.0 MG 30.0 [employer] Commercial Insurance 0 / 0

## 2024-02-01 DIAGNOSIS — M51.9 LUMBAR DISC DISEASE: ICD-10-CM

## 2024-02-01 RX ORDER — OXYCODONE HYDROCHLORIDE AND ACETAMINOPHEN 5; 325 MG/1; MG/1
TABLET ORAL
Qty: 120 TABLET | Refills: 0 | OUTPATIENT
Start: 2024-02-01

## 2024-02-01 NOTE — TELEPHONE ENCOUNTER
Medication:  PDMP   1 [id number] 01/03/2024 01/03/2024 ACETAMINOPHEN 325 MG / oxyCODONE HYDROCHLORIDE 5 MG ORAL TABLET (Tablet) 120.0 30 5.0 MG/325.0 MG 30.0 [facility] Private Pay 0 / 0 PA    1 [id number] 12/05/2023 12/05/2023 ACETAMINOPHEN 325 MG / oxyCODONE HYDROCHLORIDE 5 MG ORAL TABLET (Tablet) 120.0 30 5.0 MG/325.0 MG 30.0 [facility] Private Pay 0 / 0 PA    1 [id number] 11/06/2023 11/06/2023 ACETAMINOPHEN 325 MG / oxyCODONE HYDROCHLORIDE 5 MG ORAL TABLET (Tablet) 120.0 30 5.0 MG/325.0 MG 30.0 [facility] Private Pay 0 / 0 PA          Active agreement on file -Yes

## 2024-02-02 NOTE — TELEPHONE ENCOUNTER
Pt requesting refill from another provider, instead of waiting for Dr Muse to return on Monday.    Please advise.

## 2024-02-04 DIAGNOSIS — M51.9 LUMBAR DISC DISEASE: ICD-10-CM

## 2024-02-04 RX ORDER — OXYCODONE HYDROCHLORIDE AND ACETAMINOPHEN 5; 325 MG/1; MG/1
TABLET ORAL
Qty: 120 TABLET | Refills: 0 | Status: SHIPPED | OUTPATIENT
Start: 2024-02-04

## 2024-02-19 ENCOUNTER — OFFICE VISIT (OUTPATIENT)
Dept: FAMILY MEDICINE CLINIC | Facility: CLINIC | Age: 59
End: 2024-02-19
Payer: COMMERCIAL

## 2024-02-19 VITALS
WEIGHT: 205 LBS | DIASTOLIC BLOOD PRESSURE: 92 MMHG | SYSTOLIC BLOOD PRESSURE: 148 MMHG | HEIGHT: 69 IN | BODY MASS INDEX: 30.36 KG/M2 | OXYGEN SATURATION: 100 % | TEMPERATURE: 97.5 F | HEART RATE: 75 BPM

## 2024-02-19 DIAGNOSIS — M51.9 LUMBAR DISC DISEASE: Primary | ICD-10-CM

## 2024-02-19 DIAGNOSIS — E78.5 DYSLIPIDEMIA: ICD-10-CM

## 2024-02-19 DIAGNOSIS — Z12.11 COLON CANCER SCREENING: ICD-10-CM

## 2024-02-19 DIAGNOSIS — K04.7 DENTAL ABSCESS: ICD-10-CM

## 2024-02-19 DIAGNOSIS — N52.9 ERECTILE DYSFUNCTION, UNSPECIFIED ERECTILE DYSFUNCTION TYPE: ICD-10-CM

## 2024-02-19 DIAGNOSIS — R03.0 ELEVATED BLOOD PRESSURE READING: ICD-10-CM

## 2024-02-19 DIAGNOSIS — M51.9 THORACIC DISC DISEASE: ICD-10-CM

## 2024-02-19 DIAGNOSIS — M41.24 OTHER IDIOPATHIC SCOLIOSIS, THORACIC REGION: ICD-10-CM

## 2024-02-19 DIAGNOSIS — F11.20 CONTINUOUS OPIOID DEPENDENCE (HCC): ICD-10-CM

## 2024-02-19 DIAGNOSIS — G89.4 CHRONIC PAIN SYNDROME: ICD-10-CM

## 2024-02-19 DIAGNOSIS — Z72.0 TOBACCO USE: ICD-10-CM

## 2024-02-19 PROCEDURE — 99214 OFFICE O/P EST MOD 30 MIN: CPT | Performed by: FAMILY MEDICINE

## 2024-02-19 RX ORDER — NICOTINE 21 MG/24HR
1 PATCH, TRANSDERMAL 24 HOURS TRANSDERMAL EVERY 24 HOURS
Qty: 30 PATCH | Refills: 5 | Status: SHIPPED | OUTPATIENT
Start: 2024-02-19

## 2024-02-19 RX ORDER — TADALAFIL 20 MG/1
20 TABLET ORAL DAILY PRN
Qty: 6 TABLET | Refills: 10 | Status: SHIPPED | OUTPATIENT
Start: 2024-02-19

## 2024-02-19 RX ORDER — AMOXICILLIN 500 MG/1
500 CAPSULE ORAL EVERY 8 HOURS SCHEDULED
Qty: 30 CAPSULE | Refills: 0 | Status: SHIPPED | OUTPATIENT
Start: 2024-02-19 | End: 2024-02-29

## 2024-02-19 NOTE — PROGRESS NOTES
Name: Reilly Saha      : 1965      MRN: [id number]  Encounter Provider: Winston Muse MD  Encounter Date: 2024   Encounter department: [facility] [address]    Assessment & Plan   Return visit in 4 months.  He is to do fasting blood work that was previously prescribed.  1. Lumbar disc disease    2. Thoracic disc disease    3. Other idiopathic scoliosis, thoracic region    4. Chronic pain syndrome  Assessment & Plan:  Continue Percocet 5 mg 4 times daily      5. Continuous opioid dependence (HCC)    6. Tobacco use  -     nicotine (NICODERM CQ) 21 mg/24 hr TD 24 hr patch; Place 1 patch on the skin over 24 hours every 24 hours    7. Dyslipidemia    8. Erectile dysfunction, unspecified erectile dysfunction type  -     tadalafil (CIALIS) 20 MG tablet; Take 1 tablet (20 mg total) by mouth daily as needed for erectile dysfunction    9. Colon cancer screening  -     Cologuard    10. Dental abscess  -     amoxicillin (AMOXIL) 500 mg capsule; Take 1 capsule (500 mg total) by mouth every 8 (eight) hours for 10 days    11. Elevated blood pressure reading  Assessment & Plan:  He will monitor his blood pressure through work.          Depression Screening and Follow-up Plan: Patient was screened for depression during today's encounter. They screened negative with a PHQ-2 score of 0.    Tobacco Cessation Counseling: Tobacco cessation counseling was provided. The patient is sincerely urged to quit consumption of tobacco. He is ready to quit tobacco. Medication options and side effects of medication discussed. Patient agreed to medication. Nicotine patch was prescribed.         Subjective      Patient comes in for checkup.  He takes Percocet 5 mg 4 times daily for chronic mid and low back pain.  He has known degenerative disease and scoliosis.  He feels this allows him to work and stay active.  He has opioid agreement on file and we did urine drug testing last visit.  He complains  "of dental abscess, erectile dysfunction and wishes to quit smoking.      Review of Systems   Constitutional: Negative.    Respiratory: Negative.     Cardiovascular: Negative.    Musculoskeletal:  Positive for back pain.       Current Outpatient Medications on File Prior to Visit   Medication Sig    b complex vitamins tablet Take 1 tablet by mouth daily    Black Currant Seed Oil 500 MG CAPS Take 1 capsule by mouth daily    Cholecalciferol (Vitamin D3) 125 MCG (5000 UT) TABS Take 5,000 Units by mouth daily    Diclofenac Sodium (VOLTAREN) 1 % Apply 2 g topically 4 (four) times a day    Multiple Vitamin (multivitamin) tablet Take 1 tablet by mouth daily    oxyCODONE-acetaminophen (PERCOCET) 5-325 mg per tablet 1 qid for pain    saccharomyces boulardii (FLORASTOR) 250 mg capsule Take 250 mg by mouth 2 (two) times a day    Turmeric 500 MG CAPS Take by mouth    [DISCONTINUED] nicotine (NICODERM CQ) 14 mg/24hr TD 24 hr patch Place 1 patch on the skin over 24 hours every 24 hours    [DISCONTINUED] tadalafil (CIALIS) 5 MG tablet Take 1 tablet (5 mg total) by mouth daily as needed for erectile dysfunction       Objective     /92   Pulse 75   Temp 97.5 °F (36.4 °C)   Ht 5' 9\" (1.753 m)   Wt 93 kg (205 lb)   SpO2 100%   BMI 30.27 kg/m²     Physical Exam  Constitutional:       Appearance: Normal appearance. He is well-developed.   HENT:      Head: Normocephalic and atraumatic.   Eyes:      Pupils: Pupils are equal, round, and reactive to light.   Cardiovascular:      Rate and Rhythm: Normal rate and regular rhythm.      Heart sounds: Normal heart sounds.   Pulmonary:      Effort: Pulmonary effort is normal.      Breath sounds: Normal breath sounds.   Musculoskeletal:         General: Normal range of motion.      Cervical back: Neck supple.      Comments: Back is nontender.  Straight leg raising bilaterally is negative.   Skin:     General: Skin is warm and dry.   Neurological:      Mental Status: He is alert. "   Psychiatric:         Mood and Affect: Mood normal.         Behavior: Behavior normal.       Winston Muse MD

## 2024-02-21 PROBLEM — Z00.00 HEALTH MAINTENANCE EXAMINATION: Status: RESOLVED | Noted: 2020-01-31 | Resolved: 2024-02-21

## 2024-02-29 DIAGNOSIS — M51.9 LUMBAR DISC DISEASE: ICD-10-CM

## 2024-02-29 RX ORDER — OXYCODONE HYDROCHLORIDE AND ACETAMINOPHEN 5; 325 MG/1; MG/1
TABLET ORAL
Qty: 120 TABLET | Refills: 0 | Status: SHIPPED | OUTPATIENT
Start: 2024-02-29

## 2024-02-29 NOTE — TELEPHONE ENCOUNTER
Medication:  PDMP   1 [id number] 02/04/2024 02/04/2024 ACETAMINOPHEN 325 MG / oxyCODONE HYDROCHLORIDE 5 MG ORAL TABLET (Tablet) 120.0 30 5.0 MG/325.0 MG 30.0 [facility] Private Pay 0 / 0 PA    1 [id number] 01/03/2024 01/03/2024 ACETAMINOPHEN 325 MG / oxyCODONE HYDROCHLORIDE 5 MG ORAL TABLET (Tablet) 120.0 30 5.0 MG/325.0 MG 30.0 [facility] Private Pay 0 / 0 PA    1 [id number] 12/05/2023 12/05/2023 ACETAMINOPHEN 325 MG / oxyCODONE HYDROCHLORIDE 5 MG ORAL TABLET (Tablet) 120.0 30 5.0 MG/325.0 MG 30.0 [facility] Private Pay 0 / 0 PA    1 [id number] 11/06/2023 11/06/2023 ACETAMINOPHEN 325 MG / oxyCODONE HYDROCHLORIDE 5 MG ORAL TABLET (Tablet) 120.0 30 5.0 MG/325.0 MG 30.0 [facility] Private Pay 0 / 0 PA        Active agreement on file -Yes

## 2024-03-04 ENCOUNTER — TELEPHONE (OUTPATIENT)
Dept: FAMILY MEDICINE CLINIC | Facility: CLINIC | Age: 59
End: 2024-03-04

## 2024-03-04 DIAGNOSIS — M51.9 LUMBAR DISC DISEASE: ICD-10-CM

## 2024-03-04 RX ORDER — OXYCODONE HYDROCHLORIDE AND ACETAMINOPHEN 5; 325 MG/1; MG/1
TABLET ORAL
Qty: 120 TABLET | Refills: 0 | Status: SHIPPED | OUTPATIENT
Start: 2024-03-04 | End: 2024-03-07 | Stop reason: SDUPTHER

## 2024-03-04 NOTE — TELEPHONE ENCOUNTER
Pt called - states he picked up 1 week worth  of meds (28 pills on 03/02/2024 - pt advised by pharmacy to request a new script and prior auth for remaninig 92 pills, pt is asking to expedite his request and to start a new prior auth for his oxycodone (new health insurance requirement).     PDMP REVIEWED VIA WEB SITE : YES     1 [id number] 03/02/2024 02/29/2024 ACETAMINOPHEN 325 MG / oxyCODONE HYDROCHLORIDE 5 MG ORAL TABLET (Tablet) 28.0 7 5.0 MG/325.0 MG 30.0 [facility] Commercial Insurance 0 / 0 PA    1 [id number] 02/04/2024 02/04/2024 ACETAMINOPHEN 325 MG / oxyCODONE HYDROCHLORIDE 5 MG ORAL TABLET (Tablet) 120.0 30 5.0 MG/325.0 MG 30.0 [facility] Private Pay 0 / 0 PA    1 [id number] 01/03/2024 01/03/2024 ACETAMINOPHEN 325 MG / oxyCODONE HYDROCHLORIDE 5 MG ORAL TABLET (Tablet) 120.0 30 5.0 MG/325.0 MG 30.0 [facility] Private Pay 0 / 0 PA    1 [id number] 12/05/2023 12/05/2023 ACETAMINOPHEN 325 MG / oxyCODONE HYDROCHLORIDE 5 MG ORAL TABLET (Tablet) 120.0 30 5.0 MG/325.0 MG 30.0 BIANKA ROBERT            Please advise

## 2024-03-07 DIAGNOSIS — M51.9 LUMBAR DISC DISEASE: ICD-10-CM

## 2024-03-07 RX ORDER — OXYCODONE HYDROCHLORIDE AND ACETAMINOPHEN 5; 325 MG/1; MG/1
TABLET ORAL
Qty: 120 TABLET | Refills: 0 | Status: SHIPPED | OUTPATIENT
Start: 2024-03-07 | End: 2024-03-11 | Stop reason: SDUPTHER

## 2024-03-07 NOTE — TELEPHONE ENCOUNTER
Call from pt - pharmacy said the script need to be resent todat and this will be his new script for the month - he was previously given a 7 day supply of the 120 last month - so he was looking to get the remaining amount when he called on 3/4 - but today he is eligible for the full 120 again.

## 2024-03-11 ENCOUNTER — TELEPHONE (OUTPATIENT)
Dept: FAMILY MEDICINE CLINIC | Facility: CLINIC | Age: 59
End: 2024-03-11

## 2024-03-11 DIAGNOSIS — M51.9 LUMBAR DISC DISEASE: ICD-10-CM

## 2024-03-11 RX ORDER — OXYCODONE HYDROCHLORIDE AND ACETAMINOPHEN 5; 325 MG/1; MG/1
TABLET ORAL
Qty: 120 TABLET | Refills: 0 | Status: SHIPPED | OUTPATIENT
Start: 2024-03-11 | End: 2024-03-18 | Stop reason: SDUPTHER

## 2024-03-11 NOTE — TELEPHONE ENCOUNTER
Returned message - spoke w pt - states he was able to  28 pills of his oxyCODONE-acetaminophen (PERCOCET) 5-325 mg per tablet   (covered by his insurance), Pt requesting a new / reflecting this change/ script for the rest of the pills (92) - further on - pt pays out of the pocket (always) / every month)- aware the amount isn't covered by his health insurance.    Per PDMP pt picked up 28 pills only     PDMP REVIEWED VIA WEB SITE : YES                    1 [id number] 03/10/2024 03/07/2024 ACETAMINOPHEN 325 MG / oxyCODONE HYDROCHLORIDE 5 MG ORAL TABLET (Tablet) 28.0 7 5.0 MG/325.0 MG 30.0 GOLDIE GARCIA [facility] Commercial Insurance 0 / 0 PA    1 [id number] 03/02/2024 02/29/2024 ACETAMINOPHEN 325 MG / oxyCODONE HYDROCHLORIDE 5 MG ORAL TABLET (Tablet) 28.0 7 5.0 MG/325.0 MG 30.0 [facility] Commercial Insurance 0 / 0 PA    1 [id number] 02/04/2024 02/04/2024 ACETAMINOPHEN 325 MG / oxyCODONE HYDROCHLORIDE 5 MG ORAL TABLET (Tablet) 120.0 30 5.0 MG/325.0 MG 30.0 [facility] Private Pay 0 / 0 PA    1 [id number] 01/03/2024 01/03/2024 ACETAMINOPHEN 325 MG / oxyCODONE HYDROCHLORIDE 5 MG ORAL TABLET (Tablet) 120.0 30 5.0 MG/325.0 MG 30.0 [facility] Private Pay 0 / 0 PA    1 [id number] 12/05/2023 12/05/2023 ACETAMINOPHEN 325 MG / oxyCODONE HYDROCHLORIDE 5 MG ORAL TABLET (Tablet) 120.0 30 5.0 MG/325.0 MG 30.0 [facility] Private Pay 0 / 0 PA    1 [id number] 11/06/2023 11/06/2023 ACETAMINOPHEN 325 MG / oxyCODONE HYDROCHLORIDE 5 MG ORAL TABLET (Tablet) 120.0 30 5.0 MG/325.0 MG 30.0 [facility] Private Pay 0 / 0 PA    1 [id number] 10/09/2023 10/09/2023 ACETAMINOPHEN 325 MG / oxyCODONE HYDROCHLORIDE 5 MG ORAL TABLET (Tablet) 120.0 30 5.0 MG/325.0 MG 30.0 BIANKA ROBERT            Please advise         Pt left a VM re : oxyCODONE-acetaminophen (PERCOCET) 5-325  mg per tablet earlier :   Good morning. This is Reilly Saha, date of birth [birth year]. I spoke to the pharmacist at [facility] and they are giving me a seven day supply of she said it's causing the insurance which is not true. I called the insurance, she said. Doctor Micha can pull in an approval slip to send in without the insurance and I pay out of pocket. Then they could give me the rest of my bills. Give me a call [phone]. Thank you.

## 2024-03-18 DIAGNOSIS — M51.9 LUMBAR DISC DISEASE: ICD-10-CM

## 2024-03-18 RX ORDER — OXYCODONE HYDROCHLORIDE AND ACETAMINOPHEN 5; 325 MG/1; MG/1
TABLET ORAL
Qty: 120 TABLET | Refills: 0 | Status: SHIPPED | OUTPATIENT
Start: 2024-03-18

## 2024-03-18 NOTE — TELEPHONE ENCOUNTER
Medication:  PDMP   1 [id number] 03/10/2024 03/07/2024 ACETAMINOPHEN 325 MG / oxyCODONE HYDROCHLORIDE 5 MG ORAL TABLET (Tablet) 28.0 7 5.0 MG/325.0 MG 30.0 GOLDIE GARC[facility] Commercial Insurance 0 / 0 [place]    1 [id number] 03/02/2024 02/29/2024 ACETAMINOPHEN 325 MG / oxyCODONE HYDROCHLORIDE 5 MG ORAL TABLET (Tablet) 28.0 7 5.0 MG/325.0 MG 30.0 [facility] Commercial Insurance 0 / 0 [place]    1 [id number] 02/04/2024 02/04/2024 ACETAMINOPHEN 325 MG / oxyCODONE HYDROCHLORIDE 5 MG ORAL TABLET (Tablet) 120.0 30 5.0 MG/325.0 MG 30.0 [facility] Private Pay 0 / 0 [place]          Active agreement on file -Yes

## 2024-04-15 DIAGNOSIS — M51.9 LUMBAR DISC DISEASE: ICD-10-CM

## 2024-04-15 RX ORDER — OXYCODONE HYDROCHLORIDE AND ACETAMINOPHEN 5; 325 MG/1; MG/1
TABLET ORAL
Qty: 120 TABLET | Refills: 0 | Status: SHIPPED | OUTPATIENT
Start: 2024-04-15

## 2024-04-15 NOTE — TELEPHONE ENCOUNTER
Medication:  PDMP   1 [id number] 03/18/2024 03/18/2024 oxyCODONE HYDROCHLORIDE 5 MG ORAL TABLET/ACETAMINOPHEN 325 MG (Tablet) 120.0 30 5.0 MG/325.0 MG 30.0 [facility] Private Pay 0 / 0 [place]    1 [id number] 03/10/2024 03/07/2024 oxyCODONE HYDROCHLORIDE 5 MG ORAL TABLET/ACETAMINOPHEN 325 MG (Tablet) 28.0 7 5.0 MG/325.0 MG 30.0 GOLDIE GARCIA [facility] Commercial Insurance 0 / 0 [place]    1 [id number] 03/02/2024 02/29/2024 oxyCODONE HYDROCHLORIDE 5 MG ORAL TABLET/ACETAMINOPHEN 325 MG (Tablet) 28.0 7 5.0 MG/325.0 MG 30.0 [facility] Commercial Insurance 0 / 0 [place]            Active agreement on file -Yes

## 2024-05-13 DIAGNOSIS — M51.9 LUMBAR DISC DISEASE: ICD-10-CM

## 2024-05-13 RX ORDER — OXYCODONE HYDROCHLORIDE AND ACETAMINOPHEN 5; 325 MG/1; MG/1
TABLET ORAL
Qty: 120 TABLET | Refills: 0 | Status: SHIPPED | OUTPATIENT
Start: 2024-05-13

## 2024-05-13 NOTE — TELEPHONE ENCOUNTER
Patient called for a refill he stated he has a couple of pills remaining. Pharmacy on file it correct.       Please advise, Thank you!

## 2024-06-11 DIAGNOSIS — M51.9 LUMBAR DISC DISEASE: ICD-10-CM

## 2024-06-11 RX ORDER — OXYCODONE HYDROCHLORIDE AND ACETAMINOPHEN 5; 325 MG/1; MG/1
TABLET ORAL
Qty: 120 TABLET | Refills: 0 | Status: SHIPPED | OUTPATIENT
Start: 2024-06-11

## 2024-07-09 DIAGNOSIS — M51.9 LUMBAR DISC DISEASE: ICD-10-CM

## 2024-07-09 RX ORDER — OXYCODONE HYDROCHLORIDE AND ACETAMINOPHEN 5; 325 MG/1; MG/1
TABLET ORAL
Qty: 120 TABLET | Refills: 0 | OUTPATIENT
Start: 2024-07-09

## 2024-07-10 NOTE — TELEPHONE ENCOUNTER
Patient had been scheduled to see Dr. Muse in November because when that was scheduled, it was the only time available.  However, I have moved the appt to Wed, 7/17.  He is hoping that the doctor can send that medication in for him.  Please contact patient and advise.  Thank you.

## 2024-07-11 NOTE — TELEPHONE ENCOUNTER
Patient called questioning if his oxycodone has been refilled by Dr. Muse yet. I advised him it has not.  He is scheduled for an appointment on 7/17.      Please advise, thank you.

## 2024-07-12 NOTE — TELEPHONE ENCOUNTER
Pt called again asking if the provider would be sending in his Oxycodone, Please advise as the pt would like a call back to let him know what the provider decided

## 2024-07-17 ENCOUNTER — OFFICE VISIT (OUTPATIENT)
Dept: FAMILY MEDICINE CLINIC | Facility: CLINIC | Age: 59
End: 2024-07-17
Payer: COMMERCIAL

## 2024-07-17 VITALS
TEMPERATURE: 97.6 F | WEIGHT: 198.8 LBS | DIASTOLIC BLOOD PRESSURE: 90 MMHG | OXYGEN SATURATION: 97 % | HEART RATE: 74 BPM | SYSTOLIC BLOOD PRESSURE: 142 MMHG | BODY MASS INDEX: 29.44 KG/M2 | HEIGHT: 69 IN

## 2024-07-17 DIAGNOSIS — Z12.11 COLON CANCER SCREENING: ICD-10-CM

## 2024-07-17 DIAGNOSIS — M51.9 THORACIC DISC DISEASE: ICD-10-CM

## 2024-07-17 DIAGNOSIS — E78.5 DYSLIPIDEMIA: ICD-10-CM

## 2024-07-17 DIAGNOSIS — R03.0 ELEVATED BLOOD PRESSURE READING: ICD-10-CM

## 2024-07-17 DIAGNOSIS — F11.20 CONTINUOUS OPIOID DEPENDENCE (HCC): ICD-10-CM

## 2024-07-17 DIAGNOSIS — Z12.11 SCREEN FOR COLON CANCER: ICD-10-CM

## 2024-07-17 DIAGNOSIS — M51.9 LUMBAR DISC DISEASE: ICD-10-CM

## 2024-07-17 DIAGNOSIS — G89.4 CHRONIC PAIN SYNDROME: Primary | ICD-10-CM

## 2024-07-17 DIAGNOSIS — Z72.0 TOBACCO USE: ICD-10-CM

## 2024-07-17 PROBLEM — K04.7 DENTAL ABSCESS: Status: RESOLVED | Noted: 2024-02-19 | Resolved: 2024-07-17

## 2024-07-17 PROCEDURE — 99214 OFFICE O/P EST MOD 30 MIN: CPT | Performed by: FAMILY MEDICINE

## 2024-07-17 RX ORDER — OXYCODONE HYDROCHLORIDE AND ACETAMINOPHEN 5; 325 MG/1; MG/1
TABLET ORAL
Qty: 120 TABLET | Refills: 0 | Status: SHIPPED | OUTPATIENT
Start: 2024-07-17

## 2024-07-17 NOTE — PROGRESS NOTES
Depression Screening and Follow-up Plan: Patient was screened for depression during today's encounter. They screened negative with a PHQ-2 score of 0.    Assessment/Plan:  Return visit in 4 months.  He is get fasting blood work done as previously prescribed.       Problem List Items Addressed This Visit       Lumbar disc disease    Relevant Medications    oxyCODONE-acetaminophen (PERCOCET) 5-325 mg per tablet    Tobacco use    Thoracic disc disease    Chronic pain syndrome - Primary    Relevant Orders    [facility]cribed Meds and Special Instructions    Amphetamines, Methamphetamines    Butalbital    Phenobarbital    Secobarbital    Alprazolam    Clonazepam    Diazepam    Lorazepam    Gabapentin    Pregabalin    Cocaine    Heroin    Buprenorphine    Levorphanol    Meperidine    Naltrexone    Fentanyl    Methadone    Oxycodone    Tapentadol    THC    Tramadol    Codeine, Hydrocodone, Hydropmorphone, Morphine    Bath Salts    Ethyl Glucuronide/Ethyl Sulfate    Kratom    Spice    Methylphenidate    Phentermine    Validity Oxidant    Validity Creatinine    Validity pH    Validity Specific    Xylazine Definitive Test    Elevated blood pressure reading     He is reluctant to start blood pressure medication at this point.         Dyslipidemia    Continuous opioid dependence (HCC)     Other Visit Diagnoses       Screen for colon cancer        Colon cancer screening        Relevant Orders    Cologuard              Subjective:      Patient ID: Reilly Saha is a 58 y.o. male.    Patient comes in for checkup.  He takes Percocet 5 mg 4 times daily for chronic low back pain.  He feels this allows him to stay active.  He has opioid agreement on file and we are doing urine drug screening today.  He recently had physical examination through his work and was found to be otherwise healthy.  He states that his blood pressure was much better at his work physical.        The following portions of the patient's history were  reviewed and updated as appropriate:   Past Medical History:  He has no past medical history on file.,  _______________________________________________________________________  Medical Problems:  does not have any pertinent problems on file.,  _______________________________________________________________________  Past Surgical History:   has a past surgical history that includes Hernia repair (09/28/2019).,  _______________________________________________________________________  Family History:  family history includes Coronary artery disease in his mother; Hypertension in his mother.,  _______________________________________________________________________  Social History:   reports that he has been smoking cigarettes. He started smoking about 34 years ago. He has a 8.6 pack-year smoking history. He has never used smokeless tobacco. He reports that he does not drink alcohol and does not use drugs.,  _______________________________________________________________________  Allergies:  has No Known Allergies..  _______________________________________________________________________  Current Outpatient Medications   Medication Sig Dispense Refill    b complex vitamins tablet Take 1 tablet by mouth daily      Black Currant Seed Oil 500 MG CAPS Take 1 capsule by mouth daily      Cholecalciferol (Vitamin D3) 125 MCG (5000 UT) TABS Take 5,000 Units by mouth daily      Multiple Vitamin (multivitamin) tablet Take 1 tablet by mouth daily      nicotine (NICODERM CQ) 21 mg/24 hr TD 24 hr patch Place 1 patch on the skin over 24 hours every 24 hours 30 patch 5    oxyCODONE-acetaminophen (PERCOCET) 5-325 mg per tablet 1 qid for pain 120 tablet 0    saccharomyces boulardii (FLORASTOR) 250 mg capsule Take 250 mg by mouth 2 (two) times a day      tadalafil (CIALIS) 20 MG tablet Take 1 tablet (20 mg total) by mouth daily as needed for erectile dysfunction 6 tablet 10    Turmeric 500 MG CAPS Take by mouth       No current  "facility-administered medications for this visit.     _______________________________________________________________________  Review of Systems   Constitutional: Negative.    Respiratory: Negative.     Cardiovascular: Negative.    Musculoskeletal:  Positive for back pain.         Objective:  Vitals:    07/17/24 1010   BP: 142/90   BP Location: Left arm   Patient Position: Sitting   Cuff Size: Standard   Pulse: 74   Temp: 97.6 °F (36.4 °C)   TempSrc: Tympanic   SpO2: 97%   Weight: 90.2 kg (198 lb 12.8 oz)   Height: 5' 9\" (1.753 m)     Body mass index is 29.36 kg/m².     Physical Exam  Constitutional:       Appearance: Normal appearance. He is well-developed.   HENT:      Head: Normocephalic and atraumatic.   Eyes:      Pupils: Pupils are equal, round, and reactive to light.   Cardiovascular:      Rate and Rhythm: Normal rate and regular rhythm.      Heart sounds: Normal heart sounds.   Pulmonary:      Effort: Pulmonary effort is normal.      Breath sounds: Normal breath sounds.   Musculoskeletal:         General: Normal range of motion.      Cervical back: Neck supple.      Comments: Back is nontender.  Straight leg raising is negative bilaterally   Skin:     General: Skin is warm and dry.   Neurological:      Mental Status: He is alert.   Psychiatric:         Mood and Affect: Mood normal.         Behavior: Behavior normal.         "

## 2024-07-21 LAB

## 2024-08-09 DIAGNOSIS — M51.9 LUMBAR DISC DISEASE: ICD-10-CM

## 2024-08-09 RX ORDER — OXYCODONE HYDROCHLORIDE AND ACETAMINOPHEN 5; 325 MG/1; MG/1
TABLET ORAL
Qty: 120 TABLET | Refills: 0 | Status: SHIPPED | OUTPATIENT
Start: 2024-08-09

## 2024-09-06 DIAGNOSIS — Z72.0 TOBACCO USE: ICD-10-CM

## 2024-09-07 RX ORDER — NICOTINE 21 MG/24HR
1 PATCH, TRANSDERMAL 24 HOURS TRANSDERMAL EVERY 24 HOURS
Qty: 28 PATCH | Refills: 5 | Status: SHIPPED | OUTPATIENT
Start: 2024-09-07

## 2024-09-09 DIAGNOSIS — M51.9 LUMBAR DISC DISEASE: ICD-10-CM

## 2024-09-10 RX ORDER — OXYCODONE AND ACETAMINOPHEN 5; 325 MG/1; MG/1
1 TABLET ORAL 4 TIMES DAILY
Qty: 120 TABLET | Refills: 0 | Status: SHIPPED | OUTPATIENT
Start: 2024-09-10

## 2024-10-08 DIAGNOSIS — M51.9 LUMBAR DISC DISEASE: ICD-10-CM

## 2024-10-08 RX ORDER — OXYCODONE AND ACETAMINOPHEN 5; 325 MG/1; MG/1
1 TABLET ORAL 4 TIMES DAILY
Qty: 120 TABLET | Refills: 0 | Status: SHIPPED | OUTPATIENT
Start: 2024-10-08

## 2024-10-08 NOTE — TELEPHONE ENCOUNTER
Patient called for medication refill request     Requested Prescriptions     Pending Prescriptions Disp Refills    oxyCODONE-acetaminophen (PERCOCET) 5-325 mg per tablet 120 tablet 0     Sig: Take 1 tablet by mouth 4 (four) times a day for pain Max Daily Amount: 4 tablets

## 2024-11-04 DIAGNOSIS — M51.9 LUMBAR DISC DISEASE: ICD-10-CM

## 2024-11-04 RX ORDER — OXYCODONE AND ACETAMINOPHEN 5; 325 MG/1; MG/1
1 TABLET ORAL 4 TIMES DAILY
Qty: 120 TABLET | Refills: 0 | Status: SHIPPED | OUTPATIENT
Start: 2024-11-04

## 2024-12-05 DIAGNOSIS — M51.9 LUMBAR DISC DISEASE: ICD-10-CM

## 2024-12-06 ENCOUNTER — TELEPHONE (OUTPATIENT)
Age: 59
End: 2024-12-06

## 2024-12-06 DIAGNOSIS — M51.9 LUMBAR DISC DISEASE: ICD-10-CM

## 2024-12-06 RX ORDER — OXYCODONE AND ACETAMINOPHEN 5; 325 MG/1; MG/1
1 TABLET ORAL 4 TIMES DAILY
Qty: 120 TABLET | Refills: 0 | Status: SHIPPED | OUTPATIENT
Start: 2024-12-06

## 2024-12-06 NOTE — TELEPHONE ENCOUNTER
Patient called-again to check the status of refill request for     oxyCODONE-acetaminophen   (PERCOCET) 5-325 mg per tablet       Patients preferred pharmacy is [facility]acy #[id number] - [place] - [address] [phone]       Patients next appt. With provider is 12/30/24

## 2024-12-06 NOTE — TELEPHONE ENCOUNTER
Patient calling asking if anyone can fill this script oxyCODONE-acetaminophen (PERCOCET) 5-325 mg per tablet today due to Dr. Muse being out of the office and it is the weekend.    Please let patient know his # is [phone]

## 2024-12-08 RX ORDER — OXYCODONE AND ACETAMINOPHEN 5; 325 MG/1; MG/1
1 TABLET ORAL 4 TIMES DAILY
Qty: 120 TABLET | Refills: 0 | Status: SHIPPED | OUTPATIENT
Start: 2024-12-08

## 2024-12-30 ENCOUNTER — OFFICE VISIT (OUTPATIENT)
Dept: FAMILY MEDICINE CLINIC | Facility: CLINIC | Age: 59
End: 2024-12-30
Payer: COMMERCIAL

## 2024-12-30 VITALS
TEMPERATURE: 98 F | WEIGHT: 202 LBS | HEIGHT: 69 IN | HEART RATE: 74 BPM | OXYGEN SATURATION: 97 % | BODY MASS INDEX: 29.92 KG/M2 | DIASTOLIC BLOOD PRESSURE: 86 MMHG | SYSTOLIC BLOOD PRESSURE: 140 MMHG

## 2024-12-30 DIAGNOSIS — G89.4 CHRONIC PAIN SYNDROME: ICD-10-CM

## 2024-12-30 DIAGNOSIS — M51.9 THORACIC DISC DISEASE: ICD-10-CM

## 2024-12-30 DIAGNOSIS — M51.9 LUMBAR DISC DISEASE: ICD-10-CM

## 2024-12-30 DIAGNOSIS — Z72.0 TOBACCO USE: ICD-10-CM

## 2024-12-30 DIAGNOSIS — R03.0 ELEVATED BLOOD PRESSURE READING: ICD-10-CM

## 2024-12-30 DIAGNOSIS — R51.9 NONINTRACTABLE HEADACHE, UNSPECIFIED CHRONICITY PATTERN, UNSPECIFIED HEADACHE TYPE: ICD-10-CM

## 2024-12-30 DIAGNOSIS — E78.5 DYSLIPIDEMIA: ICD-10-CM

## 2024-12-30 DIAGNOSIS — F11.20 CONTINUOUS OPIOID DEPENDENCE (HCC): ICD-10-CM

## 2024-12-30 DIAGNOSIS — M41.24 OTHER IDIOPATHIC SCOLIOSIS, THORACIC REGION: ICD-10-CM

## 2024-12-30 DIAGNOSIS — Z00.00 HEALTH MAINTENANCE EXAMINATION: Primary | ICD-10-CM

## 2024-12-30 PROCEDURE — 99396 PREV VISIT EST AGE 40-64: CPT | Performed by: FAMILY MEDICINE

## 2024-12-30 RX ORDER — IBUPROFEN 600 MG/1
600 TABLET, FILM COATED ORAL EVERY 6 HOURS PRN
Qty: 60 TABLET | Refills: 3 | Status: SHIPPED | OUTPATIENT
Start: 2024-12-30

## 2024-12-30 NOTE — PROGRESS NOTES
Assessment/Plan:  Return visit in 4 months       Problem List Items Addressed This Visit       Health maintenance examination - Primary    Relevant Orders    PSA, Total Screen    Lumbar disc disease    Tobacco use    Thoracic disc disease    Chronic pain syndrome    Scoliosis deformity of spine    Elevated blood pressure reading    He will continue to monitor this at home.         Dyslipidemia    Relevant Orders    CBC and differential    Comprehensive metabolic panel    Lipid panel    Continuous opioid dependence (HCC)     Other Visit Diagnoses         Nonintractable headache, unspecified chronicity pattern, unspecified headache type        Relevant Medications    ibuprofen (MOTRIN) 600 mg tablet              Subjective:      Patient ID: Reilly Saha is a 59 y.o. male.    Patient comes in for checkup.  He takes 5 mg Percocet 4 times daily for chronic back pain.  He has scoliosis, thoracic and lumbar disc disease.  He feels this allows him to maintain a reasonable level of activity.  He has opioid agreement on file and we did urine drug screen last visit.  He also complains of recent problems with headaches.  These are mild and he has taken nothing for the        The following portions of the patient's history were reviewed and updated as appropriate:   Past Medical History:  He has no past medical history on file.,  _______________________________________________________________________  Medical Problems:  does not have any pertinent problems on file.,  _______________________________________________________________________  Past Surgical History:   has a past surgical history that includes Hernia repair (09/28/2019).,  _______________________________________________________________________  Family History:  family history includes Coronary artery disease in his mother; Hypertension in his mother.,  _______________________________________________________________________  Social History:   reports that he has been  smoking cigarettes. He started smoking about 35 years ago. He has a 8.7 pack-year smoking history. He has never used smokeless tobacco. He reports that he does not drink alcohol and does not use drugs.,  _______________________________________________________________________  Allergies:  has no known allergies..  _______________________________________________________________________  Current Outpatient Medications   Medication Sig Dispense Refill    b complex vitamins tablet Take 1 tablet by mouth daily      Black Currant Seed Oil 500 MG CAPS Take 1 capsule by mouth daily      Cholecalciferol (Vitamin D3) 125 MCG (5000 UT) TABS Take 5,000 Units by mouth daily      ibuprofen (MOTRIN) 600 mg tablet Take 1 tablet (600 mg total) by mouth every 6 (six) hours as needed for moderate pain (headache) 60 tablet 3    Multiple Vitamin (multivitamin) tablet Take 1 tablet by mouth daily      nicotine (NICODERM CQ) 21 mg/24 hr TD 24 hr patch PLACE 1 PATCH ON THE SKIN OVER 24 HOURS EVERY 24 HOURS 28 patch 5    oxyCODONE-acetaminophen (PERCOCET) 5-325 mg per tablet Take 1 tablet by mouth 4 (four) times a day for pain Max Daily Amount: 4 tablets 120 tablet 0    oxyCODONE-acetaminophen (PERCOCET) 5-325 mg per tablet Take 1 tablet by mouth 4 (four) times a day for pain Max Daily Amount: 4 tablets 120 tablet 0    saccharomyces boulardii (FLORASTOR) 250 mg capsule Take 250 mg by mouth 2 (two) times a day      tadalafil (CIALIS) 20 MG tablet Take 1 tablet (20 mg total) by mouth daily as needed for erectile dysfunction 6 tablet 10    Turmeric 500 MG CAPS Take by mouth       No current facility-administered medications for this visit.     _______________________________________________________________________  Review of Systems   Constitutional: Negative.    Respiratory: Negative.     Cardiovascular: Negative.    Musculoskeletal:  Positive for back pain.   Neurological:  Positive for headaches.         Objective:  Vitals:    12/30/24 0837  "  BP: 140/86   BP Location: Left arm   Patient Position: Sitting   Cuff Size: Large   Pulse: 74   Temp: 98 °F (36.7 °C)   TempSrc: Temporal   SpO2: 97%   Weight: 91.6 kg (202 lb)   Height: 5' 9\" (1.753 m)     Body mass index is 29.83 kg/m².     Physical Exam  Constitutional:       Appearance: Normal appearance. He is well-developed.   HENT:      Head: Normocephalic and atraumatic.   Eyes:      Pupils: Pupils are equal, round, and reactive to light.   Cardiovascular:      Rate and Rhythm: Normal rate and regular rhythm.      Heart sounds: Normal heart sounds.   Pulmonary:      Effort: Pulmonary effort is normal.      Breath sounds: Normal breath sounds.   Musculoskeletal:      Cervical back: Neck supple.      Comments: Scoliosis of mid back   Skin:     General: Skin is warm and dry.   Neurological:      Mental Status: He is alert.   Psychiatric:         Mood and Affect: Mood normal.         Behavior: Behavior normal.         "

## 2025-01-29 PROBLEM — Z00.00 HEALTH MAINTENANCE EXAMINATION: Status: RESOLVED | Noted: 2020-01-31 | Resolved: 2025-01-29

## 2025-02-03 DIAGNOSIS — M51.9 LUMBAR DISC DISEASE: ICD-10-CM

## 2025-02-03 RX ORDER — OXYCODONE AND ACETAMINOPHEN 5; 325 MG/1; MG/1
1 TABLET ORAL 4 TIMES DAILY
Qty: 120 TABLET | Refills: 0 | Status: SHIPPED | OUTPATIENT
Start: 2025-02-03

## 2025-02-03 NOTE — TELEPHONE ENCOUNTER
Patient called in regarding to medication refill, did let patient know the provider just has to look at it and then someone will call him with his response

## 2025-02-03 NOTE — TELEPHONE ENCOUNTER
Medication: oxyCODONE-acetaminophen (PERCOCET)    Dose/Frequency: 5-325 mg per tablet / Take 1 tablet by mouth 4 (four) times a day for pain.    Quantity: 120 tablet    Pharmacy: [facility]/pharmacy #[id number] - [place] - [address]    Office:   [x] PCP/Provider - Winston Muse MD  [] Speciality/Provider -     Does the patient have enough for 3 days?   [] Yes   [x] No - Send as HP to POD

## 2025-02-28 DIAGNOSIS — M51.9 LUMBAR DISC DISEASE: ICD-10-CM

## 2025-03-01 RX ORDER — OXYCODONE AND ACETAMINOPHEN 5; 325 MG/1; MG/1
1 TABLET ORAL 4 TIMES DAILY
Qty: 120 TABLET | Refills: 0 | Status: SHIPPED | OUTPATIENT
Start: 2025-03-01

## 2025-04-01 DIAGNOSIS — M51.9 LUMBAR DISC DISEASE: ICD-10-CM

## 2025-04-01 RX ORDER — OXYCODONE AND ACETAMINOPHEN 5; 325 MG/1; MG/1
1 TABLET ORAL 4 TIMES DAILY
Qty: 120 TABLET | Refills: 0 | Status: SHIPPED | OUTPATIENT
Start: 2025-04-01

## 2025-04-01 NOTE — TELEPHONE ENCOUNTER
Pt called in requesting med refill of the following medication:  Requested Prescriptions     Pending Prescriptions Disp Refills    oxyCODONE-acetaminophen (PERCOCET) 5-325 mg per tablet 120 tablet 0     Sig: Take 1 tablet by mouth 4 (four) times a day for pain Max Daily Amount: 4 tablets     Last filled: 03/01/25  Last appt: 12/30/24  Next appt: 06/18/25    Script going to the following pharmacy:  [id number] - [place] - [address] [phone]     Please advise & call pt back with update on script. Thank you!    Reilly: [phone]

## 2025-04-29 DIAGNOSIS — M51.9 LUMBAR DISC DISEASE: ICD-10-CM

## 2025-04-30 RX ORDER — OXYCODONE AND ACETAMINOPHEN 5; 325 MG/1; MG/1
1 TABLET ORAL 4 TIMES DAILY
Qty: 120 TABLET | Refills: 0 | Status: SHIPPED | OUTPATIENT
Start: 2025-04-30

## 2025-04-30 NOTE — TELEPHONE ENCOUNTER
Patient returning call to follow up on medication refill for   oxyCODONE-acetaminophen (PERCOCET) 5-325 mg per tablet . Patient stated he has no medication left for next 3 day.  Patient requested a call back once order is placed.     Pharmacy confirmed.   Please advise, thank you.

## 2025-05-30 DIAGNOSIS — M51.9 LUMBAR DISC DISEASE: ICD-10-CM

## 2025-05-30 NOTE — TELEPHONE ENCOUNTER
Patient called to follow up on refill request. Advised patient a HIGH PRIORITY message was sent to his PCP.    Please advise.

## 2025-05-30 NOTE — TELEPHONE ENCOUNTER
Patient request refill of Oxycodone- acetaminophen 5-325 mg to be filled with [id number].    Patient states he is completely out of medication.    Requested Prescriptions     Pending Prescriptions Disp Refills    oxyCODONE-acetaminophen (PERCOCET) 5-325 mg per tablet 120 tablet 0     Sig: Take 1 tablet by mouth 4 (four) times a day for pain Max Daily Amount: 4 tablets

## 2025-06-01 RX ORDER — OXYCODONE AND ACETAMINOPHEN 5; 325 MG/1; MG/1
1 TABLET ORAL 4 TIMES DAILY
Qty: 120 TABLET | Refills: 0 | Status: SHIPPED | OUTPATIENT
Start: 2025-06-01

## 2025-06-18 ENCOUNTER — OFFICE VISIT (OUTPATIENT)
Dept: FAMILY MEDICINE CLINIC | Facility: CLINIC | Age: 60
End: 2025-06-18
Payer: COMMERCIAL

## 2025-06-18 VITALS
TEMPERATURE: 98 F | SYSTOLIC BLOOD PRESSURE: 122 MMHG | DIASTOLIC BLOOD PRESSURE: 78 MMHG | OXYGEN SATURATION: 99 % | BODY MASS INDEX: 29.62 KG/M2 | RESPIRATION RATE: 16 BRPM | HEIGHT: 69 IN | WEIGHT: 200 LBS | HEART RATE: 68 BPM

## 2025-06-18 DIAGNOSIS — Z82.49 FAMILY HISTORY OF PREMATURE CAD: ICD-10-CM

## 2025-06-18 DIAGNOSIS — M51.9 THORACIC DISC DISEASE: ICD-10-CM

## 2025-06-18 DIAGNOSIS — Z72.0 TOBACCO USE: ICD-10-CM

## 2025-06-18 DIAGNOSIS — Z12.5 SCREENING FOR PROSTATE CANCER: ICD-10-CM

## 2025-06-18 DIAGNOSIS — M41.24 OTHER IDIOPATHIC SCOLIOSIS, THORACIC REGION: ICD-10-CM

## 2025-06-18 DIAGNOSIS — E78.5 HYPERLIPIDEMIA, UNSPECIFIED HYPERLIPIDEMIA TYPE: ICD-10-CM

## 2025-06-18 DIAGNOSIS — G89.4 CHRONIC PAIN SYNDROME: ICD-10-CM

## 2025-06-18 DIAGNOSIS — M51.9 LUMBAR DISC DISEASE: Primary | ICD-10-CM

## 2025-06-18 DIAGNOSIS — F11.20 CONTINUOUS OPIOID DEPENDENCE (HCC): ICD-10-CM

## 2025-06-18 PROBLEM — R03.0 ELEVATED BLOOD PRESSURE READING: Status: RESOLVED | Noted: 2021-03-29 | Resolved: 2025-06-18

## 2025-06-18 PROCEDURE — 99214 OFFICE O/P EST MOD 30 MIN: CPT | Performed by: FAMILY MEDICINE

## 2025-06-18 RX ORDER — NICOTINE 21 MG/24HR
1 PATCH, TRANSDERMAL 24 HOURS TRANSDERMAL EVERY 24 HOURS
Qty: 28 PATCH | Refills: 0 | Status: SHIPPED | OUTPATIENT
Start: 2025-06-18

## 2025-06-18 NOTE — PROGRESS NOTES
Depression Screening and Follow-up Plan: Patient was screened for depression during today's encounter. They screened negative with a PHQ-2 score of 0.      Assessment/Plan:    Return visit in 4 months.     Problem List Items Addressed This Visit       Chronic pain syndrome    Continue Percocet 5 mg 4 times daily         Continuous opioid dependence (HCC)    Relevant Medications    nicotine (NICODERM CQ) 14 mg/24hr TD 24 hr patch    Family history of premature CAD    Hyperlipidemia    Relevant Orders    CBC and differential    Comprehensive metabolic panel    Lipid panel    Lumbar disc disease - Primary    Scoliosis deformity of spine    Thoracic disc disease    Tobacco use    Relevant Medications    nicotine (NICODERM CQ) 14 mg/24hr TD 24 hr patch     Other Visit Diagnoses         Screening for prostate cancer        Relevant Orders    PSA, Total Screen              Subjective:      Patient ID: Reilly Saha is a 59 y.o. male.    Patient comes in for checkup.  He takes Percocet 5 mg 4 times daily for chronic mid low back pain.  He has opioid agreement on file and we are doing urine drug screen today.  He wishes to quit smoking.        The following portions of the patient's history were reviewed and updated as appropriate:   Past Medical History:  He has no past medical history on file.,  _______________________________________________________________________  Medical Problems:  does not have any pertinent problems on file.,  _______________________________________________________________________  Past Surgical History:   has a past surgical history that includes Hernia repair (09/28/2019).,  _______________________________________________________________________  Family History:  family history includes Coronary artery disease in his mother; Hypertension in his mother.,  _______________________________________________________________________  Social History:   reports that he has been smoking cigarettes. He  "started smoking about 35 years ago. He has a 8.9 pack-year smoking history. He has never used smokeless tobacco. He reports that he does not drink alcohol and does not use drugs.,  _______________________________________________________________________  Allergies:  has no known allergies..  _______________________________________________________________________  Current Medications[1]  _______________________________________________________________________  Review of Systems   Constitutional: Negative.    Respiratory: Negative.     Cardiovascular: Negative.    Musculoskeletal:  Positive for back pain.         Objective:  Vitals:    06/18/25 0838   BP: 122/78   BP Location: Left arm   Patient Position: Sitting   Cuff Size: Standard   Pulse: 68   Resp: 16   Temp: 98 °F (36.7 °C)   TempSrc: Tympanic   SpO2: 99%   Weight: 90.7 kg (200 lb)   Height: 5' 9\" (1.753 m)     Body mass index is 29.53 kg/m².     Physical Exam  Constitutional:       Appearance: Normal appearance. He is well-developed.   HENT:      Head: Normocephalic and atraumatic.     Eyes:      Pupils: Pupils are equal, round, and reactive to light.       Cardiovascular:      Rate and Rhythm: Normal rate and regular rhythm.      Heart sounds: Normal heart sounds.   Pulmonary:      Effort: Pulmonary effort is normal.      Breath sounds: Normal breath sounds.   Abdominal:      General: Bowel sounds are normal.     Musculoskeletal:         General: Normal range of motion.      Cervical back: Neck supple.      Comments: Thoracic scoliosis.  No back tenderness.  Straight leg raising is negative bilaterally.     Skin:     General: Skin is warm and dry.     Neurological:      Mental Status: He is alert.     Psychiatric:         Mood and Affect: Mood normal.         Behavior: Behavior normal.              [1]   Current Outpatient Medications   Medication Sig Dispense Refill    b complex vitamins tablet Take 1 tablet by mouth in the morning.      Black Currant Seed Oil " 500 MG CAPS Take 1 capsule by mouth in the morning.      Cholecalciferol (Vitamin D3) 125 MCG (5000 UT) TABS Take 5,000 Units by mouth in the morning.      Multiple Vitamin (multivitamin) tablet Take 1 tablet by mouth in the morning.      nicotine (NICODERM CQ) 14 mg/24hr TD 24 hr patch Place 1 patch on the skin every 24 hours over 24 hours 28 patch 0    saccharomyces boulardii (FLORASTOR) 250 mg capsule Take 250 mg by mouth in the morning and 250 mg in the evening.      tadalafil (CIALIS) 20 MG tablet Take 1 tablet (20 mg total) by mouth daily as needed for erectile dysfunction 6 tablet 10    Turmeric 500 MG CAPS Take by mouth      ibuprofen (MOTRIN) 600 mg tablet Take 1 tablet (600 mg total) by mouth every 6 (six) hours as needed for moderate pain (headache) (Patient not taking: Reported on 6/18/2025) 60 tablet 3    oxyCODONE-acetaminophen (PERCOCET) 5-325 mg per tablet Take 1 tablet by mouth 4 (four) times a day for pain Max Daily Amount: 4 tablets (Patient not taking: Reported on 6/18/2025) 120 tablet 0     No current facility-administered medications for this visit.

## 2025-06-20 LAB

## 2025-06-28 DIAGNOSIS — M51.9 LUMBAR DISC DISEASE: ICD-10-CM

## 2025-06-28 NOTE — TELEPHONE ENCOUNTER
Medication Refill Request       Medication: oxyCODONE-acetaminophen (PERCOCET) 5-325 mg per tablet     Dose/Frequency:     Take 1 tablet by mouth 4 (four) times a day for pain Max Daily Amount: 4 tablets       Quantity: 120    Pharmacy: [facility]/pharmacy #[id number] - [place] - [address]     Office:   [x] PCP/Provider -   [] Specialty/Provider -     Does the patient have enough for 3 days?   [] Yes   [x] No - Send as HP to POD    Is the patient completely out of the medication or does not have enough until the next business day?  [x] Yes - send to [facility]  [] No - Send as HP to POD

## 2025-06-30 RX ORDER — OXYCODONE AND ACETAMINOPHEN 5; 325 MG/1; MG/1
1 TABLET ORAL 4 TIMES DAILY
Qty: 120 TABLET | Refills: 0 | Status: SHIPPED | OUTPATIENT
Start: 2025-06-30

## 2025-06-30 NOTE — TELEPHONE ENCOUNTER
Patient called back this morning stating he is completely out of the Oxycodone and requesting a refill

## 2025-07-14 DIAGNOSIS — Z72.0 TOBACCO USE: ICD-10-CM

## 2025-07-14 RX ORDER — NICOTINE 21 MG/24HR
1 PATCH, TRANSDERMAL 24 HOURS TRANSDERMAL EVERY 24 HOURS
Qty: 28 PATCH | Refills: 5 | Status: SHIPPED | OUTPATIENT
Start: 2025-07-14

## 2025-07-29 DIAGNOSIS — N52.9 ERECTILE DYSFUNCTION, UNSPECIFIED ERECTILE DYSFUNCTION TYPE: ICD-10-CM

## 2025-07-29 DIAGNOSIS — M51.9 LUMBAR DISC DISEASE: ICD-10-CM

## 2025-07-30 RX ORDER — OXYCODONE AND ACETAMINOPHEN 5; 325 MG/1; MG/1
1 TABLET ORAL 4 TIMES DAILY
Qty: 120 TABLET | Refills: 0 | Status: SHIPPED | OUTPATIENT
Start: 2025-07-30

## 2025-07-30 RX ORDER — TADALAFIL 20 MG/1
20 TABLET ORAL DAILY PRN
Qty: 6 TABLET | Refills: 5 | Status: SHIPPED | OUTPATIENT
Start: 2025-07-30